# Patient Record
Sex: FEMALE | Race: WHITE | Employment: FULL TIME | URBAN - METROPOLITAN AREA
[De-identification: names, ages, dates, MRNs, and addresses within clinical notes are randomized per-mention and may not be internally consistent; named-entity substitution may affect disease eponyms.]

---

## 2017-08-31 ENCOUNTER — HOSPITAL ENCOUNTER (OUTPATIENT)
Dept: ULTRASOUND IMAGING | Age: 27
Discharge: HOME | End: 2017-08-31
Payer: COMMERCIAL

## 2017-08-31 ENCOUNTER — APPOINTMENT (OUTPATIENT)
Dept: CT IMAGING | Age: 27
End: 2017-08-31
Payer: COMMERCIAL

## 2017-08-31 DIAGNOSIS — Q89.9: ICD-10-CM

## 2017-08-31 PROCEDURE — 76770 US EXAM ABDO BACK WALL COMP: CPT

## 2017-09-27 LAB
HBSAG, EXTERNAL: NEGATIVE
HIV, EXTERNAL: NORMAL
RUBELLA, EXTERNAL: NORMAL
TYPE, ABO & RH, EXTERNAL: NORMAL

## 2018-01-25 LAB
ANTIBODY SCREEN, EXTERNAL: NEGATIVE
T. PALLIDUM, EXTERNAL: NEGATIVE

## 2018-03-22 LAB — GRBS, EXTERNAL: NEGATIVE

## 2018-04-26 ENCOUNTER — HOSPITAL ENCOUNTER (EMERGENCY)
Age: 28
Discharge: HOME | End: 2018-04-27
Attending: OBSTETRICS & GYNECOLOGY | Admitting: OBSTETRICS & GYNECOLOGY
Payer: COMMERCIAL

## 2018-04-26 VITALS
HEIGHT: 63 IN | BODY MASS INDEX: 35.26 KG/M2 | DIASTOLIC BLOOD PRESSURE: 70 MMHG | TEMPERATURE: 98.6 F | WEIGHT: 199 LBS | HEART RATE: 85 BPM | SYSTOLIC BLOOD PRESSURE: 120 MMHG | RESPIRATION RATE: 16 BRPM

## 2018-04-26 RX ORDER — LORATADINE 10 MG/1
10 TABLET ORAL DAILY
COMMUNITY
End: 2018-04-29

## 2018-04-26 NOTE — IP AVS SNAPSHOT
[address] 
[phone] Patient: Albina Valencia MRN: [id number] VXC:0/96/2236 A check katia indicates which time of day the medication should be taken. My Medications ASK your doctor about these medications Instructions Each Dose to Equal  
 Morning Noon Evening Bedtime CLARITIN 10 mg tablet Generic drug:  loratadine Your last dose was: Your next dose is: Take 10 mg by mouth daily. Indications: Allergic Rhinitis 10 mg PNV66-Iron Fumarate-FA-DSS-DHA 26-1.2- mg Cap Your last dose was: Your next dose is: Take  by mouth daily. Indications: pregnancy

## 2018-04-26 NOTE — IP AVS SNAPSHOT
[address] [facility] [address] [place] 
[phone] Patient: Darryle Pummel MRN: [id number] KERRIE:[birth year] About your hospitalization You were admitted on:  N/A You last received care in the:  [facility] 3 LABOR & DELIVERY You were discharged on:  April 27, 2018 Why you were hospitalized Your primary diagnosis was:  Not on File Follow-up Information Follow up With Details Comments Contact Info Faby Yu MD Call If symptoms worsen [facility] [address] [place] 
[phone] Discharge Orders None A check katia indicates which time of day the medication should be taken. My Medications ASK your doctor about these medications Instructions Each Dose to Equal  
 Morning Noon Evening Bedtime CLARITIN 10 mg tablet Generic drug:  loratadine Your last dose was: Your next dose is: Take 10 mg by mouth daily. Indications: Allergic Rhinitis 10 mg PNV66-Iron Fumarate-FA-DSS-DHA 26-1.2- mg Cap Your last dose was: Your next dose is: Take  by mouth daily. Indications: pregnancy Discharge Instructions Week 40 of Your Pregnancy: Care Instructions Your Care Instructions By week 36, you have reached your due date. Your baby could be coming any day. But it's a good idea to think ahead to the next few weeks and what might happen. If this is your first time having a baby, try not to worry. If you don't start labor on your own by 41 or 42 weeks, your doctor may recommend giving you medicines to start labor. This care sheet gives you information about how labor can be started. It also gives you some ideas about breathing exercises you can do if you start to feel anxious or if you are trying to relax. Follow-up care is a key part of your treatment and safety. Be sure to make and go to all appointments, and call your doctor if you are having problems. It's also a good idea to know your test results and keep a list of the medicines you take. How can you care for yourself at home? Learn how labor can be started · If you and your baby are both healthy and ready, and if your cervix has started to open, your doctor may \"break your water\" (rupture the amniotic sac). This often starts labor. · If your cervix is not quite ready, you may get a medicine called Pitocin through an IV to start contractions. · If your cervix is still very firm, you may have prostaglandin tablets (misoprostol) placed in your vagina to soften the cervix. Try guided imagery to help you relax · Find a comfortable place to sit or lie down. Close your eyes. · Start by just taking a few deep breaths to help you relax. · Picture a setting that is calm and peaceful. This could be a beach, a mountain setting, a meadow, or a scene that you choose. · Imagine your scene, and try to add some detail. For example, is there a breeze? What does the kev look like? Is it clear, or are there clouds? · It often helps to add a path to your scene. For example, as you enter the meadow, imagine a path leading you through the meadow to the trees on the other side. As you follow the path farther into the [facility] you feel more and more relaxed. · When you are deep into your scene and are feeling relaxed, take a few minutes to breathe slowly and feel the calm. · When you are ready, slowly take yourself out of the scene back to the present. Tell yourself that you will feel relaxed and refreshed and will bring that sense of calm with you. · Count to 3, and open your eyes. Where can you learn more? Go to http://kaylyn-jesus.info/. Enter X784 in the search box to learn more about \"Week 40 of Your Pregnancy: Care Instructions. \" 
 Current as of: March 16, 2017 Content Version: 11.4 © 8605-1179 Healthwise, [employer]. Care instructions adapted under license by [employer] (which disclaims liability or warranty for this information). If you have questions about a medical condition or this instruction, always ask your healthcare professional. [address] any warranty or liability for your use of this information. Introducing [facility]! Brittney Blandon introduces [employer] patient portal. Now you can access parts of your medical record, email your doctor's office, and request medication refills online. 1. In your internet browser, go to [email or web address] 2. Click on the First Time User? Click Here link in the Sign In box. You will see the New Member Sign Up page. 3. Enter your [employer] Access Code exactly as it appears below. You will not need to use this code after youve completed the sign-up process. If you do not sign up before the expiration date, you must request a new code. · [employer] Access Code: [email or web address] Expires: 7/26/2018 12:36 AM 
 
4. Enter the last four digits of your Social Security Number (xxxx) and Date of Birth (mm/dd/yyyy) as indicated and click Submit. You will be taken to the next sign-up page. 5. Create a [employer] ID. This will be your [employer] login ID and cannot be changed, so think of one that is secure and easy to remember. 6. Create a [employer] password. You can change your password at any time. 7. Enter your Password Reset Question and Answer. This can be used at a later time if you forget your password. 8. Enter your e-mail address. You will receive e-mail notification when new information is available in [address] 9. Click Sign Up. You can now view and download portions of your medical record. 10. Click the Download Summary menu link to download a portable copy of your medical information. If you have questions, please visit the Frequently Asked Questions section of the MyChart website. Remember, [employer] is NOT to be used for urgent needs. For medical emergencies, dial 911. Now available from your iPhone and Android! Introducing Enrique Clifford As a [facility] patient, I wanted to make you aware of our electronic visit tool called Enrique Clifford. Yuyutonakitamb 24/7 allows you to connect within minutes with a medical provider 24 hours a day, seven days a week via a mobile device or tablet or logging into a secure website from your computer. You can access Enrique Clifford from anywhere in the [place]. A virtual visit might be right for you when you have a simple condition and feel like you just dont want to get out of bed, or cant get away from work for an appointment, when your regular [facility] provider is not available (evenings, weekends or holidays), or when youre out of town and need minor care. Electronic visits cost only $49 and if the [facility] 24/7 provider determines a prescription is needed to treat your condition, one can be electronically transmitted to a nearby pharmacy*. Please take a moment to enroll today if you have not already done so. The enrollment process is free and takes just a few minutes. To enroll, please download the [employer] 24/7 mario to your tablet or phone, or visit [email or web address] to enroll on your computer. And, as an [address] [place] [address] patient with a [employer]ractive account, the results of your visits will be scanned into your electronic medical record and your primary care provider will be able to view the scanned results. We urge you to continue to see your regular [facility] provider for your ongoing medical care.   And while your primary care provider may not be the one available when you seek a Enrique Clifford virtual visit, the peace of mind you get from getting a real diagnosis real time can be priceless. For more information on Enrique Ervinfany, view our Frequently Asked Questions (FAQs) at [email or web address]. Sincerely, 
 
Teri Marrero MD 
Chief Medical Officer [address] *:  certain medications cannot be prescribed via Enrique Clifford Providers Seen During Your Hospitalization Provider Specialty Primary office phone Glenda Lira MD Obstetrics & Gynecology [phone] Your Primary Care Physician (PCP) Primary Care Physician Office Phone Office Fax OTHER, PHYS ** None ** ** None ** You are allergic to the following Allergen Reactions Latex Rash Other Food Anaphylaxis Honor Perone Recent Documentation Height Weight Breastfeeding? BMI OB Status Smoking Status 1.6 m 90.3 kg No 35.25 kg/m2 Pregnant Never Smoker Emergency Contacts Name Discharge Info Relation Home Work Mobile Jeremi Meyer DISCHARGE CAREGIVER [3] Spouse [3] [phone] Patient Belongings The following personal items are in your possession at time of discharge: 
  Dental Appliances: None  Visual Aid: Contacts, Glasses, With patient   Hearing Aids/Status: With patient, Bilateral  Home Medications: Kept at bedside (Claritin)   Jewelry: Ring, Earrings  Clothing: At bedside, Pants, Shirt, With patient, Undergarments, Footwear    Other Valuables: Cell Phone, Sierra Ball  Personal Items Sent to Safe: none Please provide this summary of care documentation to your next provider. Signatures-by signing, you are acknowledging that this After Visit Summary has been reviewed with you and you have received a copy. Patient Signature:  ____________________________________________________________ Date:  ____________________________________________________________  
  
Camille Chvaez  Provider Signature: ____________________________________________________________ Date:  ____________________________________________________________

## 2018-04-27 ENCOUNTER — HOSPITAL ENCOUNTER (INPATIENT)
Age: 28
LOS: 2 days | Discharge: HOME | End: 2018-04-29
Attending: OBSTETRICS & GYNECOLOGY | Admitting: OBSTETRICS & GYNECOLOGY
Payer: COMMERCIAL

## 2018-04-27 LAB
ERYTHROCYTE [DISTWIDTH] IN BLOOD BY AUTOMATED COUNT: 13.9 % (ref 11.5–14.5)
HCT VFR BLD AUTO: 42 % (ref 35–47)
HGB BLD-MCNC: 14.1 G/DL (ref 11.5–16)
MCH RBC QN AUTO: 31.1 PG (ref 26–34)
MCHC RBC AUTO-ENTMCNC: 33.6 G/DL (ref 30–36.5)
MCV RBC AUTO: 92.7 FL (ref 80–99)
NRBC # BLD: 0 K/UL (ref 0–0.01)
NRBC BLD-RTO: 0 PER 100 WBC
PLATELET # BLD AUTO: 175 K/UL (ref 150–400)
PMV BLD AUTO: 11.1 FL (ref 8.9–12.9)
RBC # BLD AUTO: 4.53 M/UL (ref 3.8–5.2)
WBC # BLD AUTO: 12.4 K/UL (ref 3.6–11)

## 2018-04-27 PROCEDURE — 74011250636 HC RX REV CODE- 250/636: Performed by: OBSTETRICS & GYNECOLOGY

## 2018-04-27 PROCEDURE — 36415 COLL VENOUS BLD VENIPUNCTURE: CPT | Performed by: OBSTETRICS & GYNECOLOGY

## 2018-04-27 PROCEDURE — 75410000003 HC RECOV DEL/VAG/CSECN EA 0.5 HR

## 2018-04-27 PROCEDURE — 65410000002 HC RM PRIVATE OB

## 2018-04-27 PROCEDURE — 74011250637 HC RX REV CODE- 250/637: Performed by: OBSTETRICS & GYNECOLOGY

## 2018-04-27 PROCEDURE — 99283 EMERGENCY DEPT VISIT LOW MDM: CPT

## 2018-04-27 PROCEDURE — 75410000000 HC DELIVERY VAGINAL/SINGLE

## 2018-04-27 PROCEDURE — 75410000002 HC LABOR FEE PER 1 HR

## 2018-04-27 PROCEDURE — 85027 COMPLETE CBC AUTOMATED: CPT | Performed by: OBSTETRICS & GYNECOLOGY

## 2018-04-27 RX ORDER — HYDROCORTISONE ACETATE PRAMOXINE HCL 2.5; 1 G/100G; G/100G
CREAM TOPICAL AS NEEDED
Status: DISCONTINUED | OUTPATIENT
Start: 2018-04-27 | End: 2018-04-29 | Stop reason: HOSPADM

## 2018-04-27 RX ORDER — SODIUM CHLORIDE 0.9 % (FLUSH) 0.9 %
SYRINGE (ML) INJECTION
Status: DISPENSED
Start: 2018-04-27 | End: 2018-04-28

## 2018-04-27 RX ORDER — IBUPROFEN 400 MG/1
800 TABLET ORAL EVERY 8 HOURS
Status: DISCONTINUED | OUTPATIENT
Start: 2018-04-27 | End: 2018-04-29 | Stop reason: HOSPADM

## 2018-04-27 RX ORDER — SIMETHICONE 80 MG
80 TABLET,CHEWABLE ORAL
Status: DISCONTINUED | OUTPATIENT
Start: 2018-04-27 | End: 2018-04-29 | Stop reason: HOSPADM

## 2018-04-27 RX ORDER — DIPHENHYDRAMINE HCL 25 MG
25 CAPSULE ORAL
Status: DISCONTINUED | OUTPATIENT
Start: 2018-04-27 | End: 2018-04-29 | Stop reason: HOSPADM

## 2018-04-27 RX ORDER — SODIUM CHLORIDE 0.9 % (FLUSH) 0.9 %
5-10 SYRINGE (ML) INJECTION AS NEEDED
Status: CANCELLED | OUTPATIENT
Start: 2018-04-27

## 2018-04-27 RX ORDER — ACETAMINOPHEN 325 MG/1
650 TABLET ORAL
Status: DISCONTINUED | OUTPATIENT
Start: 2018-04-27 | End: 2018-04-27 | Stop reason: HOSPADM

## 2018-04-27 RX ORDER — MINERAL OIL
OIL (ML) ORAL
Status: DISPENSED
Start: 2018-04-27 | End: 2018-04-28

## 2018-04-27 RX ORDER — TERBUTALINE SULFATE 1 MG/ML
0.25 INJECTION SUBCUTANEOUS AS NEEDED
Status: DISCONTINUED | OUTPATIENT
Start: 2018-04-27 | End: 2018-04-27 | Stop reason: HOSPADM

## 2018-04-27 RX ORDER — OXYCODONE AND ACETAMINOPHEN 5; 325 MG/1; MG/1
1 TABLET ORAL
Status: DISCONTINUED | OUTPATIENT
Start: 2018-04-27 | End: 2018-04-29 | Stop reason: HOSPADM

## 2018-04-27 RX ORDER — ONDANSETRON 2 MG/ML
INJECTION INTRAMUSCULAR; INTRAVENOUS
Status: DISPENSED
Start: 2018-04-27 | End: 2018-04-28

## 2018-04-27 RX ORDER — ONDANSETRON 2 MG/ML
4 INJECTION INTRAMUSCULAR; INTRAVENOUS
Status: DISCONTINUED | OUTPATIENT
Start: 2018-04-27 | End: 2018-04-27 | Stop reason: HOSPADM

## 2018-04-27 RX ORDER — SODIUM CHLORIDE, SODIUM LACTATE, POTASSIUM CHLORIDE, CALCIUM CHLORIDE 600; 310; 30; 20 MG/100ML; MG/100ML; MG/100ML; MG/100ML
125 INJECTION, SOLUTION INTRAVENOUS CONTINUOUS
Status: CANCELLED | OUTPATIENT
Start: 2018-04-27

## 2018-04-27 RX ORDER — ACETAMINOPHEN 325 MG/1
650 TABLET ORAL
Status: DISCONTINUED | OUTPATIENT
Start: 2018-04-27 | End: 2018-04-29 | Stop reason: HOSPADM

## 2018-04-27 RX ORDER — OXYTOCIN IN 5 % DEXTROSE 30/500 ML
PLASTIC BAG, INJECTION (ML) INTRAVENOUS
Status: DISPENSED
Start: 2018-04-27 | End: 2018-04-28

## 2018-04-27 RX ORDER — ONDANSETRON 4 MG/1
4 TABLET, ORALLY DISINTEGRATING ORAL
Status: DISCONTINUED | OUTPATIENT
Start: 2018-04-27 | End: 2018-04-29 | Stop reason: HOSPADM

## 2018-04-27 RX ORDER — SODIUM CHLORIDE 0.9 % (FLUSH) 0.9 %
5-10 SYRINGE (ML) INJECTION EVERY 8 HOURS
Status: CANCELLED | OUTPATIENT
Start: 2018-04-27

## 2018-04-27 RX ORDER — LIDOCAINE HYDROCHLORIDE 10 MG/ML
INJECTION INFILTRATION; PERINEURAL
Status: DISPENSED
Start: 2018-04-27 | End: 2018-04-28

## 2018-04-27 RX ORDER — NALOXONE HYDROCHLORIDE 0.4 MG/ML
0.4 INJECTION, SOLUTION INTRAMUSCULAR; INTRAVENOUS; SUBCUTANEOUS AS NEEDED
Status: DISCONTINUED | OUTPATIENT
Start: 2018-04-27 | End: 2018-04-29 | Stop reason: HOSPADM

## 2018-04-27 RX ORDER — NALOXONE HYDROCHLORIDE 0.4 MG/ML
0.4 INJECTION, SOLUTION INTRAMUSCULAR; INTRAVENOUS; SUBCUTANEOUS AS NEEDED
Status: DISCONTINUED | OUTPATIENT
Start: 2018-04-27 | End: 2018-04-27 | Stop reason: HOSPADM

## 2018-04-27 RX ORDER — OXYTOCIN/RINGER'S LACTATE 20/1000 ML
125-500 PLASTIC BAG, INJECTION (ML) INTRAVENOUS ONCE
Status: ACTIVE | OUTPATIENT
Start: 2018-04-27 | End: 2018-04-28

## 2018-04-27 RX ADMIN — ONDANSETRON 4 MG: 2 INJECTION INTRAMUSCULAR; INTRAVENOUS at 12:45

## 2018-04-27 RX ADMIN — IBUPROFEN 800 MG: 400 TABLET ORAL at 22:37

## 2018-04-27 NOTE — DISCHARGE INSTRUCTIONS
Week 40 of Your Pregnancy: Care Instructions  Your Care Instructions    By week 40, you have reached your due date. Your baby could be coming any day. But it's a good idea to think ahead to the next few weeks and what might happen. If this is your first time having a baby, try not to worry. If you don't start labor on your own by 41 or 42 weeks, your doctor may recommend giving you medicines to start labor. This care sheet gives you information about how labor can be started. It also gives you some ideas about breathing exercises you can do if you start to feel anxious or if you are trying to relax. Follow-up care is a key part of your treatment and safety. Be sure to make and go to all appointments, and call your doctor if you are having problems. It's also a good idea to know your test results and keep a list of the medicines you take. How can you care for yourself at home? Learn how labor can be started  · If you and your baby are both healthy and ready, and if your cervix has started to open, your doctor may \"break your water\" (rupture the amniotic sac). This often starts labor. · If your cervix is not quite ready, you may get a medicine called Pitocin through an IV to start contractions. · If your cervix is still very firm, you may have prostaglandin tablets (misoprostol) placed in your vagina to soften the cervix. Try guided imagery to help you relax  · Find a comfortable place to sit or lie down. Close your eyes. · Start by just taking a few deep breaths to help you relax. · Picture a setting that is calm and peaceful. This could be a beach, a mountain setting, a meadow, or a scene that you choose. · Imagine your scene, and try to add some detail. For example, is there a breeze? What does the kev look like? Is it clear, or are there clouds? · It often helps to add a path to your scene.  For example, as you enter the meadow, imagine a path leading you through the meadow to the trees on the other side. As you follow the path farther into the [facility] you feel more and more relaxed. · When you are deep into your scene and are feeling relaxed, take a few minutes to breathe slowly and feel the calm. · When you are ready, slowly take yourself out of the scene back to the present. Tell yourself that you will feel relaxed and refreshed and will bring that sense of calm with you. · Count to 3, and open your eyes. Where can you learn more? Go to http://kaylyn-jesus.info/. Enter I328 in the search box to learn more about \"Week 40 of Your Pregnancy: Care Instructions. \"  Current as of: March 16, 2017  Content Version: 11.4  © 5875-2810 Healthwise, [employer]. Care instructions adapted under license by [employer] (which disclaims liability or warranty for this information). If you have questions about a medical condition or this instruction, always ask your healthcare professional. [address] any warranty or liability for your use of this information.

## 2018-04-27 NOTE — H&P
History & Physical    Name: Rachel Ordonez MRN: [id number]  SSN: [id number]    YOB: 1990  Age: 33 y.o. Sex: female        Subjective:     Estimated Date of Delivery: 18  OB History    Para Term  AB Living   1        SAB TAB Ectopic Molar Multiple Live Births              # Outcome Date GA Lbr Ron/2nd Weight Sex Delivery Anes PTL Lv   1 Current                   Ms. Socorro Sargent is admitted with pregnancy at 40w5d for active labor. Prenatal course was normal. Please see prenatal records for details. Past Medical History:   Diagnosis Date    Asthma      Past Surgical History:   Procedure Laterality Date    HX OTHER SURGICAL      Jaw surgery     HX WISDOM TEETH EXTRACTION       Social History     Occupational History    Not on file. Social History Main Topics    Smoking status: Never Smoker    Smokeless tobacco: Never Used    Alcohol use No    Drug use: No    Sexual activity: Yes     Partners: Male     Birth control/ protection: None     Family History   Problem Relation Age of Onset    Hypertension Mother        Allergies   Allergen Reactions    Latex Rash    Other Food Anaphylaxis     [place]     Prior to Admission medications    Medication Sig Start Date End Date Taking? Authorizing Provider   PNV66-Iron Fumarate-FA-DSS-DHA 26-1.2- mg cap Take  by mouth daily. Indications: pregnancy    Historical Provider   loratadine (CLARITIN) 10 mg tablet Take 10 mg by mouth daily. Indications: Allergic Rhinitis    Historical Provider        Review of Systems: A comprehensive review of systems was negative except for that written in the HPI.     Objective:     Vitals:  Vitals:    18 0954   BP: 129/76   Pulse: 81   Resp: 16   Temp: 98.2 °F (36.8 °C)   Weight: 90.3 kg (199 lb)   Height: 5' 3\" (1.6 m)        Physical Exam:per L and D staff  Cervical Exam: 4 cm dilated    80% effaced    -2 station    Presenting Part: cephalic  Membranes:  Intact  Fetal Heart Rate: Reactive    Prenatal Labs:   Lab Results   Component Value Date/Time    Rubella, External Immune 09/27/2017    GrBStrep, External Negative 03/22/2018    HBsAg, External Negative 09/27/2017    HIV, External Non-Reactive 09/27/2017    ABO,Rh O Positive 09/27/2017         Assessment/Plan:     Active Problems:    * No active hospital problems. *       Plan: Admit for Reassuring fetal status, Continue plan for vaginal delivery. Group B Strep was negative.     Signed By:  Connie Goodpasture, MD     April 27, 2018

## 2018-04-27 NOTE — PROGRESS NOTES
: Bedside, Verbal and Written shift change report given to SINGH Flores (oncoming nurse) by LAZ Hector RN (offgoing nurse). Report included the following information SBAR.     :  of female. : Patient up to bedside commode. Unable to void at this time. : TRANSFER - OUT REPORT:    Verbal report given to ALFREDO Herzog RN(name) on Yonatan Pichardo  being transferred to labor and delivery (unit) for routine progression of care       Report consisted of patients Situation, Background, Assessment and   Recommendations(SBAR). Information from the following report(s) SBAR was reviewed with the receiving nurse. Lines:   Peripheral IV 18 Right Forearm (Active)   Site Assessment Clean, dry, & intact 2018  8:04 PM   Phlebitis Assessment 0 2018  8:04 PM   Infiltration Assessment 0 2018  8:04 PM   Dressing Status Clean, dry, & intact 2018  8:04 PM   Dressing Type Transparent 2018  8:04 PM   Hub Color/Line Status Pink 2018  8:04 PM        Opportunity for questions and clarification was provided.       Patient transported with:   Registered Nurse

## 2018-04-27 NOTE — PROGRESS NOTES
5722: Patient arrived from home with complaints of contractions since 0300 this morning. Patient denies signs of SROM, states spotting after cervical exam yesterday. States positive fetal movement. Placed in LD room 7 and assessing now. 1000: Dr. Yolanda Bishop notified of patient's arrival    [phone]: Dr. Yolanda Bishop in to meet patient. 1700: Bedside and Verbal shift change report given to LAZ Gomez (oncoming nurse) by ADELA Bae (offgoing nurse). Report included the following information SBAR, Procedure Summary, Intake/Output, MAR and Recent Results.

## 2018-04-27 NOTE — IP AVS SNAPSHOT
2700 [facility] [address] [place] [address] 
[phone] Patient: John Erwin MRN: [id number] [facility]:8[birth year]39[birth year]94 About your hospitalization You were admitted on:  April 27, 2018 You last received care in the:  [address] [facility] You were discharged on:  April 29, 2018 Why you were hospitalized Your primary diagnosis was:  Not on File Your diagnoses also included:  Pregnancy Follow-up Information Follow up With Details Comments Contact Info Marvel Alvarez MD   Patient can only remember the practice name and not the physician Odalis Camarillo MD Schedule an appointment as soon as possible for a visit in 7 weeks  [facility] 200 [address] [place] [address] 
[phone] Discharge Orders None A check katia indicates which time of day the medication should be taken. My Medications START taking these medications Instructions Each Dose to Equal  
 Morning Noon Evening Bedtime  
 ibuprofen 600 mg tablet Commonly known as:  MOTRIN Your last dose was: Your next dose is: Take 1 Tab by mouth every six (6) hours as needed for Pain. 600 mg CONTINUE taking these medications Instructions Each Dose to Equal  
 Morning Noon Evening Bedtime PNV66-Iron Fumarate-FA-DSS-DHA 26-1.2- mg Cap Your last dose was: Your next dose is: Take  by mouth daily. Indications: pregnancy STOP taking these medications CLARITIN 10 mg tablet Generic drug:  loratadine Where to Get Your Medications Information on where to get these meds will be given to you by the nurse or doctor. ! Ask your nurse or doctor about these medications  
  ibuprofen 600 mg tablet Discharge Instructions POSTPARTUM DISCHARGE INSTRUCTIONS Name:  John Erwin YOB: 1990 Admission Diagnosis:  . Pregnancy Discharge Diagnosis:   
Problem List as of 4/28/2018  Never Reviewed Codes Class Noted - Resolved Pregnancy ICD-10-CM: Z34.90 ICD-9-CM: V22.2  4/28/2018 - Present Attending Physician:  Taiwo Maurice MD 
 
Delivery Type:  Vaginal Childbirth: What To Expect At Home Your Recovery: Your body will slowly heal in the next few weeks. It is easy to get too tired and overwhelmed during the first weeks after your baby is born. Changes in your hormones can shift your mood without warning. You may find it hard to meet the extra demands on your energy and time. Take it easy on yourself. Follow-up care is a key part of your treatment and safety. Be sure to make and go to all appointments, and call your doctor if you are having problems. It's also a good idea to know your test results and keep a list of the medicines you take. How can you care for yourself at home? Vaginal bleeding and cramps · After delivery, you will have a bloody discharge from the vagina. This will turn pink within a week and then white or yellow after about 10 days. It may last for 2 to 4 weeks or longer, until the uterus has healed. Use pads instead of tampons until you stop bleeding. · Do not worry if you pass some blood clots, as long as they are smaller than a golf ball. If you have a tear or stitches in your vaginal area, change the pad at least every 4 hours to prevent soreness and infection. · You may have cramps for the first few days after childbirth. These are normal and occur as the uterus shrinks to normal size. Take an over-the-counter pain medicine, such as acetaminophen (Tylenol), ibuprofen (Advil, Motrin), or naproxen (Aleve), for cramps. Read and follow all instructions on the label. Do not take aspirin, because it can cause more bleeding.   Do not take acetaminophen (Tylenol) and other acetaminophen containing medications (i.e. Percocet) at the same time. Breast fullness · Your breasts may overfill (engorge) in the first few days after delivery. To help milk flow and to relieve pain, warm your breasts in the shower or by using warm, moist towels before nursing. · If you are not nursing, do not put warmth on your breasts or touch your breasts. Wear a tight bra or sports bra and use ice until the fullness goes away. This usually takes 2 to 3 days. · Put ice or a cold pack on your breast after nursing to reduce swelling and pain. Put a thin cloth between the ice and your skin. Activity · Eat a balanced diet. Do not try to lose weight by cutting calories. Keep taking your prenatal vitamins, or take a multivitamin. · Get as much rest as you can. Try to take naps when your baby sleeps during the day. · Get some exercise every day. But do not do any heavy exercise until your doctor says it is okay. · Wait until you are healed (about 4 to 6 weeks) before you have sexual intercourse. Your doctor will tell you when it is okay to have sex. · Talk to your doctor about birth control. You can get pregnant even before your period returns. Also, you can get pregnant while you are breast-feeding. Mental Health · Many women get the \"baby blues\" during the first few days after childbirth. You may lose sleep, feel irritable, and cry easily. You may feel happy one minute and sad the next. Hormone changes are one cause of these emotional changes. Also, the demands of a new baby, along with visits from relatives or other family needs, add to a mother's stress. The \"baby blues\" often peak around the fourth day. Then they ease up in less than 2 weeks. · If your moodiness or anxiety lasts for more than 2 weeks, or if you feel like life is not worth living, you may have postpartum depression. This is different for each mother.  Some mothers with serious depression may worry intensely about their infant's well-being. Others may feel distant from their child. Some mothers might even feel that they might harm their baby. A mother may have signs of paranoia, wondering if someone is watching her. · With all the changes in your life, you may not know if you are depressed. Pregnancy sometimes causes changes in how you feel that are similar to the symptoms of depression. · Symptoms of depression include: · Feeling sad or hopeless and losing interest in daily activities. These are the most common symptoms of depression. · Sleeping too much or not enough. · Feeling tired. You may feel as if you have no energy. · Eating too much or too little. · POSTPARTUM SUPPORT INTERNATIONAL (PSI) offers a Warm line; Chat with the Expert phone sessions; Information and Articles about Pregnancy and Postpartum Mood Disorders; Comprehensive List of Free Support Groups; Knowledgeable local coordinators who will offer support, information, and resources; Guide to Resources on [employer]; Calendar of events in the  mood disorders community; Latest News and Research; and [facility] [address] for [employer]. Remember - You are not alone; You are not to blame; With help, you will be well. 3-362-624-PPD(2020). WWW. POSTPARTUM. NET · Writing or talking about death, such as writing suicide notes or talking about guns, knives, or pills. Keep the numbers for these national suicide hotlines: 3-709-870-TALK (5-912.391.2688) and 5-005-FRIVUYK (8-981.140.2744). If you or someone you know talks about suicide or feeling hopeless, get help right away. Constipation and Hemorrhoids · Drink plenty of fluids, enough so that your urine is light yellow or clear like water. If you have kidney, heart, or liver disease and have to limit fluids, talk with your doctor before you increase the amount of fluids you drink. · Eat plenty of fiber each day.  Have a bran muffin or bran cereal for breakfast, and try eating a piece of fruit for a mid-afternoon snack. · For painful, itchy hemorrhoids, put ice or a cold pack on the area several times a day for 10 minutes at a time. Follow this by putting a warm compress on the area for another 10 to 20 minutes or by sitting in a shallow, warm bath. When should you call for help? Call 911 anytime you think you may need emergency care. For example, call if: 
· You are thinking of hurting yourself, your baby, or anyone else. · You passed out (lost consciousness). · You have symptoms of a blood clot in your lung (called a pulmonary embolism). These may include:   
· Sudden chest pain. · Trouble breathing. · Coughing up blood. Call your doctor now or seek immediate medical care if: 
· You have severe vaginal bleeding. · You are soaking through a pad each hour for 2 or more hours. · Your vaginal bleeding seems to be getting heavier or is still bright red 4 days after delivery. · You are dizzy or lightheaded, or you feel like you may faint. · You are vomiting or cannot keep fluids down. · You have a fever. · You have new or more belly pain. · You pass tissue (not just blood). · Your vaginal discharge smells bad. · Your belly feels tender or full and hard. · Your breasts are continuously painful or red. · You feel sad, anxious, or hopeless for more than a few days. · You have sudden, severe pain in your belly. · You have symptoms of a blood clot in your leg (called a deep vein thrombosis),  
       such as: 
· Pain in your calf, back of the knee, thigh, or groin. · Redness and swelling in your leg or groin. · You have symptoms of preeclampsia, such as: 
· Sudden swelling of your face, hands, or feet. · New vision problems (such as dimness or blurring). · A severe headache. · Your blood pressure is higher than it should be or rises suddenly. · You have new nausea or vomiting. Watch closely for changes in your health, and be sure to contact your doctor if you have any problems. Additional Information:  Not applicable These are general instructions for a healthy lifestyle: No smoking/ No tobacco products/ Avoid exposure to second hand smoke Surgeon General's Warning:  Quitting smoking now greatly reduces serious risk to your health. Obesity, smoking, and sedentary lifestyle greatly increases your risk for illness A healthy diet, regular physical exercise & weight monitoring are important for maintaining a healthy lifestyle Recognize signs and symptoms of STROKE: 
 
F-face looks uneven A-arms unable to move or move unevenly S-speech slurred or non-existent T-time-call 911 as soon as signs and symptoms begin - DO NOT go  
    back to bed or wait to see if you get better - TIME IS BRAIN. I have had the opportunity to make my options or choices for discharge. I have received and understand these instructions. Introducing [facility]! [employer] introduces Unifysquare patient portal. Now you can access parts of your medical record, email your doctor's office, and request medication refills online. 1. In your internet browser, go to [email or web address] 2. Click on the First Time User? Click Here link in the Sign In box. You will see the New Member Sign Up page. 3. Enter your Unifysquare Access Code exactly as it appears below. You will not need to use this code after youve completed the sign-up process. If you do not sign up before the expiration date, you must request a new code. · Unifysquare Access Code: [email or web address] Expires: 7/26/2018 12:36 AM 
 
4. Enter the last four digits of your Social Security Number (xxxx) and Date of Birth (mm/dd/yyyy) as indicated and click Submit. You will be taken to the next sign-up page. 5. Create a Unifysquare ID.  This will be your Unifysquare login ID and cannot be changed, so think of one that is secure and easy to remember. 6. Create a Champion Windows password. You can change your password at any time. 7. Enter your Password Reset Question and Answer. This can be used at a later time if you forget your password. 8. Enter your e-mail address. You will receive e-mail notification when new information is available in [address] 9. Click Sign Up. You can now view and download portions of your medical record. 10. Click the Download Summary menu link to download a portable copy of your medical information. If you have questions, please visit the Frequently Asked Questions section of the [employer] Windows website. Remember, Champion Windows is NOT to be used for urgent needs. For medical emergencies, dial 911. Now available from your iPhone and Android! Introducing Enrique Clifford As a Kristgeoff Gutierrezek patient, I wanted to make you aware of our electronic visit tool called Enrique Darrin. Enoc Garcia 24/7 allows you to connect within minutes with a medical provider 24 hours a day, seven days a week via a mobile device or tablet or logging into a secure website from your computer. You can access Enrique Clifford from anywhere in the [place]. A virtual visit might be right for you when you have a simple condition and feel like you just dont want to get out of bed, or cant get away from work for an appointment, when your regular Kriste Peek provider is not available (evenings, weekends or holidays), or when youre out of town and need minor care. Electronic visits cost only $49 and if the Enoc Gutierrezek 24/7 provider determines a prescription is needed to treat your condition, one can be electronically transmitted to a nearby pharmacy*. Please take a moment to enroll today if you have not already done so. The enrollment process is free and takes just a few minutes.   To enroll, please download the [employer] 24/7 mario to your tablet or phone, or visit [email or web address] to enroll on your computer. And, as an [address] [place] [address] patient with a [employer] account, the results of your visits will be scanned into your electronic medical record and your primary care provider will be able to view the scanned results. We urge you to continue to see your regular [facility] provider for your ongoing medical care. And while your primary care provider may not be the one available when you seek a CollabNet virtual visit, the peace of mind you get from getting a real diagnosis real time can be priceless. For more information on CollabNet, view our Frequently Asked Questions (FAQs) at [email or web address]. Sincerely, 
 
Dianah Sicard, MD 
Chief Medical Officer [facility] Kayleigh Guerrero *:  certain medications cannot be prescribed via CollabNet Providers Seen During Your Hospitalization Provider Specialty Primary office phone Salina Alaniz MD Obstetrics & Gynecology [phone] Immunizations Administered for This Admission Name Date MMR  Deferred () Rho(D) Immune Globulin - IM  Deferred () Your Primary Care Physician (PCP) Primary Care Physician Office Phone Office Fax OTHER, PHYS ** None ** ** None ** You are allergic to the following Allergen Reactions Latex Rash Other Food Anaphylaxis Brennan Tonny Recent Documentation Height Weight Breastfeeding? BMI OB Status Smoking Status 1.6 m 90.3 kg Unknown 35.25 kg/m2 Recent pregnancy Never Smoker Emergency Contacts Name Discharge Info Relation Home Work Mobile BetsyJeremi DISCHARGE CAREGIVER [3] Spouse [3] [phone] Patient Belongings  The following personal items are in your possession at time of discharge: 
  Dental Appliances: None  Visual Aid: Glasses   Hearing Aids/Status: With patient, Bilateral  Home Medications: None   Jewelry: With patient Clothing: At bedside    Other Valuables: At bedside, With patient  Personal Items Sent to Safe: none Please provide this summary of care documentation to your next provider. Signatures-by signing, you are acknowledging that this After Visit Summary has been reviewed with you and you have received a copy. Patient Signature:  ____________________________________________________________ Date:  ____________________________________________________________  
  
Jamey Matsu Provider Signature:  ____________________________________________________________ Date:  ____________________________________________________________

## 2018-04-27 NOTE — H&P
Labor and Delivery Admission Note  2018    33 y.o., , female, G1 P 0 at 40.4 weeks an an  Estimated Date of Delivery18:by dates and US presents with c/o vaginal bleeding at 2300  Reports good fetal movement and has mild contractions. She was examined today 2cm, and noted some mucously bleeding now dark red tonight, not soaking a pad. +FM, denies LOF. She has no c/o HA, visual changes, ST, cough, SOB, abd pain, n/d or swelling. PNC: Blood type: O            RH: pos            Rubella: immune            SVII serology: Non reactive             GBS status: Neg             HIV: Neg             HBsAg: Neg    No past medical history on file. - Asthma, hearing loss  No past surgical history on file. OB/GYN: G1, no h/o STD's  Meds:   No current facility-administered medications for this encounter. Allergies: Allergies not on file  Pertinent ROS: All pertinent reviewed and negative unless otherwise noted in the HPI. No family history on file. Social History     Social History    Marital status:      Spouse name: N/A    Number of children: N/A    Years of education: N/A     Occupational History    Not on file.      Social History Main Topics    Smoking status: Not on file    Smokeless tobacco: Not on file    Alcohol use Not on file    Drug use: Not on file    Sexual activity: Not on file     Other Topics Concern    Not on file     Social History Narrative    No narrative on file       OBJECTIVE:  Gravid , female NAD  Temp (24hrs), Av.6 °F (37 °C), Min:98.6 °F (37 °C), Max:98.6 °F (37 °C)    Visit Vitals    /70 (BP 1 Location: Left arm, BP Patient Position: At rest)    Pulse 85    Temp 98.6 °F (37 °C)    Resp 16    Breastfeeding No       Labs:  No results found for: WBC    Exam:  HEENT:  normal   Lungs:  clear  Cor:  RRR  Abdomen:  Fundal height 39                    Soft between UC                    Clinical EFW 7.5 lbs  Fetal heart rate tracin's + accels  Contraction pattern: q 4-5, mild  Cervix:  2/40 per nursing  Fluid:  intact  Pelvimetry:  AP-good                      Arch- adequate                      Sidewalls- adequate                      Pelvis feels adequate for fetus. Impression:    32 yo G1 at 40.4 weeks  Vag bleeding   Suspect d/t cervical exam today, resolving  R/o labor  GBS neg  Reass FS    Plan:   Plan to walk, drea 2 hours to r/o or in labor  NST  Plan reviewed and questions answered.           Royer Moran MD

## 2018-04-27 NOTE — PROGRESS NOTES
2300: , 40.5, pt of Dr. Claudia Flowers admitted to LDR 9 for intermittent contractions and scant vaginal bleeding. Pt states she saw blood on her toilet paper after using the restroom this evening, pt was seen in the office today and a cervical exam was performed. Pt reports no bleeding after this occurrence. Confirms + FM, and denies LOF. 2330: SVE performed, unchanged from office visit today. 2358: Dr. Diane Lang in room to assess pt and review EFM. Pt not complaining of discomfort with contractions at this time, pt presented with opportunity to ambulate in hallway and be reevaluated. 0030: Pt wishes to go home and sleep instead of ambulating in hallway. Dr. Diane Lang back in room to evaluate pt. Discharge instructions received at this time.

## 2018-04-28 PROBLEM — Z34.90 PREGNANCY: Status: ACTIVE | Noted: 2018-04-28

## 2018-04-28 PROCEDURE — 65410000002 HC RM PRIVATE OB

## 2018-04-28 PROCEDURE — 74011250637 HC RX REV CODE- 250/637: Performed by: OBSTETRICS & GYNECOLOGY

## 2018-04-28 RX ORDER — IBUPROFEN 600 MG/1
600 TABLET ORAL
Qty: 30 TAB | Refills: 6 | Status: SHIPPED | OUTPATIENT
Start: 2018-04-28 | End: 2020-04-05

## 2018-04-28 RX ADMIN — IBUPROFEN 800 MG: 400 TABLET ORAL at 18:04

## 2018-04-28 RX ADMIN — IBUPROFEN 800 MG: 400 TABLET ORAL at 07:28

## 2018-04-28 NOTE — LACTATION NOTE
This note was copied from a baby's chart. Asked by Dr. Mayra Toussaint to deep suction the baby because baby was gagging and spitting up in moms room. Baby taken to nursery and deep suctioned for a large amount of thick mucus with old blood. Returned to mom. 830 - Initial Lactation Consultation -  Baby born vaginally last night to a  mom at 36 5/7 weeks gestation. Mom states she has not been able to get the baby to latch. She has been showing feeding cues but not latching. Baby would not suck on my finger. We put baby skin to skin and I showed mom hand expression. With assistance mom was able to express a total of 15 drops of colostrum from both breasts and we put the colostrum into the baby's mouth. Moms nipples are everted but short. She has a latch assist at the bedside. Feeding Plan: Mother will keep baby skin to skin as often as possible, feed on demand,  respond to feeding cues, obtain latch, listen for audible swallowing, be aware of signs of oxytocin release/ cramping,thrist,sleepyness while breastfeeding, offer both breasts,and will not limit feedings. Mom will call out for assistance when baby begins to show more feeding cues.

## 2018-04-28 NOTE — PROGRESS NOTES
Post-Partum Day Number 1 Progress Note    Zulema Meyer     Assessment: Doing well, post partum day 1    Plan:  1. Continue routine postpartum and perineal care as well as maternal education. 2. N/A     Information for the patient's :  Ross Harris [[id number]]   Vaginal, Spontaneous Delivery   Patient doing well without significant complaint. Voiding without difficulty, normal lochia. Vitals:  Visit Vitals    /82 (BP 1 Location: Left arm, BP Patient Position: At rest)    Pulse 91    Temp 97.8 °F (36.6 °C)    Resp 16    Ht 5' 3\" (1.6 m)    Wt 90.3 kg (199 lb)    Breastfeeding Unknown    BMI 35.25 kg/m2     Temp (24hrs), Av.4 °F (36.9 °C), Min:97.8 °F (36.6 °C), Max:99.3 °F (37.4 °C)        Exam:   Patient without distress. Abdomen soft, fundus firm, nontender                Perineum with normal lochia noted. Lower extremities are negative for swelling, cords or tenderness. Labs:     Lab Results   Component Value Date/Time    WBC 12.4 (H) 2018 11:32 AM    HGB 14.1 2018 11:32 AM    HCT 42.0 2018 11:32 AM    PLATELET 262  11:32 AM       No results found for this or any previous visit (from the past 24 hour(s)).

## 2018-04-28 NOTE — L&D DELIVERY NOTE
Delivery Summary    Patient: Tracy Richardson MRN: [id number]  SSN: [id number]    YOB: 1990  Age: 33 y.o. Sex: female        Labor Events:    Labor: No    Rupture Date: 2018    Rupture Time: 7:34 PM    Rupture Type SROM    Amniotic Fluid Volume:      Amniotic Fluid Description: Clear  None    Induction: None        Augmentation: None    Labor Complications: Additional Complications:        Cervical Ripening:       None      Delivery Events:  Episiotomy:      Laceration(s): Vaginal      Repaired: None     Number of Repair Packets: 0    Suture Type and Size:         Estimated Blood Loss (ml): 0        Information for the patient's :  Cele Almazan [[id number]]     Delivery Summary - Baby    Delivery Date: 2018   Delivery Time: 8:42 PM   Delivery Type: Vaginal, Spontaneous Delivery  Sex:  female  Gestational Age: 39w6d  Delivery Clinician:  Dayday Davalos  Living?:     Delivery Location: L&D             APGARS  One minute Five minutes Ten minutes   Skin Color:            Heart Rate:             Reflex Irritability:             Muscle Tone:           Respiration:             Total:               Presentation: Vertex  Position:   Occiput Anterior  Resuscitation Method:  Suctioning-bulb; Tactile Stimulation     Meconium Stained:      Cord Information:     Complications:    Cord Blood Sent?:  Yes    Blood Gases Sent?:  No    Placenta:  Date/Time:    Removal: Spontaneous      Appearance:       [place] Measurements:  Birth Weight:      Birth Length:     Head Circumference:       Chest Circumference:      Abdominal Girth:       Other Providers:   BOB VALDEZ Obstetrician;Primary Nurse;Primary [place] Nurse;Nicu Nurse;Neonatologist;Anesthesiologist;Crna;Nurse Practitioner;Midwife;Nursery Nurse           Cord Blood Results:  Information for the patient's :  Cele Almazan [[id number]]   No results found for: ABORH, PCTABR, PCTDIG, BILI, ABORHEXT, [address]    Information for the patient's :  Mehrdad Beat [[id number]]   No results found for: APH, APCO2, APO2, AHCO3, ABEC, ABDC, O2ST, SITE, RSCOM, PHI, PCO2I, PO2I, HCO3I, SO2I, IBD     Information for the patient's :  [place] Beat [[id number]]   No results found for: EPHV, PCO2V, PO2V, HCO3V, O2STV, EBDV

## 2018-04-29 VITALS
RESPIRATION RATE: 16 BRPM | HEIGHT: 63 IN | HEART RATE: 80 BPM | SYSTOLIC BLOOD PRESSURE: 114 MMHG | WEIGHT: 199 LBS | DIASTOLIC BLOOD PRESSURE: 73 MMHG | TEMPERATURE: 97.5 F | BODY MASS INDEX: 35.26 KG/M2

## 2018-04-29 PROCEDURE — 74011250637 HC RX REV CODE- 250/637: Performed by: OBSTETRICS & GYNECOLOGY

## 2018-04-29 RX ADMIN — IBUPROFEN 800 MG: 400 TABLET ORAL at 10:02

## 2018-04-29 RX ADMIN — IBUPROFEN 800 MG: 400 TABLET ORAL at 02:24

## 2018-04-29 NOTE — DISCHARGE SUMMARY
Obstetrical Discharge Summary     Name: Viktoria Blanchard MRN: [id number]  SSN: [id number]    YOB: 1990  Age: 33 y.o. Sex: female      Admit Date: 2018    Discharge Date: 2018     Admitting Physician: Leighann Jeffries MD     Attending Physician:  No att. providers found     Admission Diagnoses: . Pregnancy    Discharge Diagnoses:   Information for the patient's :  Diana Ray [[id number]]   Delivery of a 3.405 kg female infant via Vaginal, Spontaneous Delivery on 2018 at 8:42 PM  by . Apgars were 7 and 8. Additional Diagnoses:   Hospital Problems  Never Reviewed          Codes Class Noted POA    Pregnancy ICD-10-CM: Z34.90  ICD-9-CM: V22.2  2018 Unknown             Lab Results   Component Value Date/Time    Rubella, External Immune 2017    GrBStrep, External Negative 2018       Hospital Course: Normal hospital course following the delivery. Disposition at Discharge: Home or self care    Discharged Condition: Stable    Patient Instructions:   Cannot display discharge medications since this patient is not currently admitted. Reference my discharge instructions.     Follow-up Appointments   Procedures    FOLLOW UP VISIT Appointment in: 6 Weeks     Standing Status:   Standing     Number of Occurrences:   1     Order Specific Question:   Appointment in     Answer:   6 Weeks        Signed By:  Leighann Jeffries MD     2018

## 2018-04-29 NOTE — DISCHARGE SUMMARY
Obstetrical Discharge Summary     Name: Nevaeh Alberts MRN: [id number]  SSN: [id number]    YOB: 1990  Age: 33 y.o. Sex: female      Admit Date: 2018    Discharge Date: 2018     Admitting Physician: Calvert Nyhan, MD     Attending Physician:  Rose Coyne MD     Admission Diagnoses: . Pregnancy    Discharge Diagnoses:   Information for the patient's :  Mehrdad Beat [[id number]]   Delivery of a 3.405 kg female infant via Vaginal, Spontaneous Delivery on 2018 at 8:42 PM  by . Apgars were 7 and 8. Additional Diagnoses:   Hospital Problems  Never Reviewed          Codes Class Noted POA    Pregnancy ICD-10-CM: Z34.90  ICD-9-CM: V22.2  2018 Unknown             Lab Results   Component Value Date/Time    Rubella, External Immune 2017    GrBStrep, External Negative 2018       Hospital Course: Normal hospital course following the delivery. Disposition at Discharge: Home or self care    Discharged Condition: Stable    Patient Instructions:   Current Discharge Medication List      START taking these medications    Details   ibuprofen (MOTRIN) 600 mg tablet Take 1 Tab by mouth every six (6) hours as needed for Pain. Qty: 30 Tab, Refills: 6         CONTINUE these medications which have NOT CHANGED    Details   PNV66-Iron Fumarate-FA-DSS-DHA 26-1.2- mg cap Take  by mouth daily. Indications: pregnancy         STOP taking these medications       loratadine (CLARITIN) 10 mg tablet Comments:   Reason for Stopping:               Reference my discharge instructions.     Follow-up Appointments   Procedures    FOLLOW UP VISIT Appointment in: 6 Weeks     Standing Status:   Standing     Number of Occurrences:   1     Order Specific Question:   Appointment in     Answer:   6 Weeks        Signed By:  Calvert Nyhan, MD     2018

## 2018-04-29 NOTE — LACTATION NOTE
This note was copied from a baby's chart. Mom states the baby has been latching with the shield. She said she has a good suck on the shield but mom said she is getting very tired after a few minutes of sucking. We were putting a small amount of sugar water in her mouth while she is latched onto the shield to help her keep the baby interested in sucking. Mom said there is a small amount of colostrum in the nipple shield after baby nurses. Mom is able to get the baby to latch to both breasts with the shield. Mom pumped a couple times last evening after nursing and gave the baby drops of colostrum. Dr Laci Ann wants the mom to continue to work on feedings today. She wants a repeat baby weight later this afternoon and she wants baby to pc with formula or breastmilk. I set mom up with the double electric breast pump and encouraged her to pump for 15 minutes after nursing. She will give the baby any colostrum she expresses. She will call out for assistance as needed. Mom will breast feed every 3 hours. 5 -  Mom states the baby nursed for about 10 minutes on the right breast and another 5 on the left breast.  She said the baby has a strong suck on the shield but tires quickly. Mom pumped and we were able to collect 2 cc of breast milk. I showed dad how to syringe feed the breast milk to the baby. Next feeding will be at 6 am.    36 - Mom says she cannot get the baby to latch. She is very sleepy. She tried for about 15 minutes without getting a latch. Mom will pump and offer any breast milk she collects. 36 - Mom states the baby nursed with shield at 1. The parents say the baby is getting better at latching and staying latched on the shield. Mom is pumping after nursing and getting about 2 cc's of colostrum. Baby is being discharged with mom this afternoon. The pediatrician wants mom to continue to put the baby to breast but to then offer 15 cc's of colostrum or formula afterwards.  I reviewed with parents the feeding schedule and they expressed confidence in their ability to feed the baby. They will take the baby to the pediatrician in the morning for a check up. Parents have no further questions for lactation.

## 2018-04-29 NOTE — DISCHARGE INSTRUCTIONS
POSTPARTUM DISCHARGE INSTRUCTIONS       Name:  Deborah Naqvi  YOB: 1990  Admission Diagnosis:  . Pregnancy     Discharge Diagnosis:    Problem List as of 4/28/2018  Never Reviewed          Codes Class Noted - Resolved    Pregnancy ICD-10-CM: Z34.90  ICD-9-CM: V22.2  4/28/2018 - Present            Attending Physician:  Shawna Colón MD    Delivery Type:  Vaginal Childbirth: What To Expect At Home    Your Recovery: Your body will slowly heal in the next few weeks. It is easy to get too tired and overwhelmed during the first weeks after your baby is born. Changes in your hormones can shift your mood without warning. You may find it hard to meet the extra demands on your energy and time. Take it easy on yourself. Follow-up care is a key part of your treatment and safety. Be sure to make and go to all appointments, and call your doctor if you are having problems. It's also a good idea to know your test results and keep a list of the medicines you take. How can you care for yourself at home? Vaginal bleeding and cramps  · After delivery, you will have a bloody discharge from the vagina. This will turn pink within a week and then white or yellow after about 10 days. It may last for 2 to 4 weeks or longer, until the uterus has healed. Use pads instead of tampons until you stop bleeding. · Do not worry if you pass some blood clots, as long as they are smaller than a golf ball. If you have a tear or stitches in your vaginal area, change the pad at least every 4 hours to prevent soreness and infection. · You may have cramps for the first few days after childbirth. These are normal and occur as the uterus shrinks to normal size. Take an over-the-counter pain medicine, such as acetaminophen (Tylenol), ibuprofen (Advil, Motrin), or naproxen (Aleve), for cramps. Read and follow all instructions on the label. Do not take aspirin, because it can cause more bleeding.   Do not take acetaminophen (Tylenol) and other acetaminophen containing medications (i.e. Percocet) at the same time. Breast fullness  · Your breasts may overfill (engorge) in the first few days after delivery. To help milk flow and to relieve pain, warm your breasts in the shower or by using warm, moist towels before nursing. · If you are not nursing, do not put warmth on your breasts or touch your breasts. Wear a tight bra or sports bra and use ice until the fullness goes away. This usually takes 2 to 3 days. · Put ice or a cold pack on your breast after nursing to reduce swelling and pain. Put a thin cloth between the ice and your skin. Activity  · Eat a balanced diet. Do not try to lose weight by cutting calories. Keep taking your prenatal vitamins, or take a multivitamin. · Get as much rest as you can. Try to take naps when your baby sleeps during the day. · Get some exercise every day. But do not do any heavy exercise until your doctor says it is okay. · Wait until you are healed (about 4 to 6 weeks) before you have sexual intercourse. Your doctor will tell you when it is okay to have sex. · Talk to your doctor about birth control. You can get pregnant even before your period returns. Also, you can get pregnant while you are breast-feeding. Mental Health  · Many women get the \"baby blues\" during the first few days after childbirth. You may lose sleep, feel irritable, and cry easily. You may feel happy one minute and sad the next. Hormone changes are one cause of these emotional changes. Also, the demands of a new baby, along with visits from relatives or other family needs, add to a mother's stress. The \"baby blues\" often peak around the fourth day. Then they ease up in less than 2 weeks. · If your moodiness or anxiety lasts for more than 2 weeks, or if you feel like life is not worth living, you may have postpartum depression. This is different for each mother.  Some mothers with serious depression may worry intensely about their infant's well-being. Others may feel distant from their child. Some mothers might even feel that they might harm their baby. A mother may have signs of paranoia, wondering if someone is watching her. · With all the changes in your life, you may not know if you are depressed. Pregnancy sometimes causes changes in how you feel that are similar to the symptoms of depression. · Symptoms of depression include:  · Feeling sad or hopeless and losing interest in daily activities. These are the most common symptoms of depression. · Sleeping too much or not enough. · Feeling tired. You may feel as if you have no energy. · Eating too much or too little. · [employer] ([employer]I) offers a Warm line; Chat with the Expert phone sessions; Information and Articles about Pregnancy and Postpartum Mood Disorders; Comprehensive List of Free Support Groups; Knowledgeable local coordinators who will offer support, information, and resources; Guide to Resources on [employer]; Calendar of events in the  mood disorders community; Latest News and Research; and [facility] [address] for [employer]. Remember - You are not alone; You are not to blame; With help, you will be well. 0-916-771-PPD(0773). WWW. POSTPARTUM. NET   · Writing or talking about death, such as writing suicide notes or talking about guns, knives, or pills. Keep the numbers for these national suicide hotlines: 7-719-756-TALK (3-997.886.4011) and 3-994-KYOEAMH (7-868.651.6617). If you or someone you know talks about suicide or feeling hopeless, get help right away. Constipation and Hemorrhoids  · Drink plenty of fluids, enough so that your urine is light yellow or clear like water. If you have kidney, heart, or liver disease and have to limit fluids, talk with your doctor before you increase the amount of fluids you drink. · Eat plenty of fiber each day.  Have a bran muffin or bran cereal for breakfast, and try eating a piece of fruit for a mid-afternoon snack. · For painful, itchy hemorrhoids, put ice or a cold pack on the area several times a day for 10 minutes at a time. Follow this by putting a warm compress on the area for another 10 to 20 minutes or by sitting in a shallow, warm bath. When should you call for help? Call 911 anytime you think you may need emergency care. For example, call if:  · You are thinking of hurting yourself, your baby, or anyone else. · You passed out (lost consciousness). · You have symptoms of a blood clot in your lung (called a pulmonary embolism). These may include:    · Sudden chest pain. · Trouble breathing. · Coughing up blood. Call your doctor now or seek immediate medical care if:  · You have severe vaginal bleeding. · You are soaking through a pad each hour for 2 or more hours. · Your vaginal bleeding seems to be getting heavier or is still bright red 4 days after delivery. · You are dizzy or lightheaded, or you feel like you may faint. · You are vomiting or cannot keep fluids down. · You have a fever. · You have new or more belly pain. · You pass tissue (not just blood). · Your vaginal discharge smells bad. · Your belly feels tender or full and hard. · Your breasts are continuously painful or red. · You feel sad, anxious, or hopeless for more than a few days. · You have sudden, severe pain in your belly. · You have symptoms of a blood clot in your leg (called a deep vein thrombosis),          such as:  · Pain in your calf, back of the knee, thigh, or groin. · Redness and swelling in your leg or groin. · You have symptoms of preeclampsia, such as:  · Sudden swelling of your face, hands, or feet. · New vision problems (such as dimness or blurring). · A severe headache. · Your blood pressure is higher than it should be or rises suddenly. · You have new nausea or vomiting.     Watch closely for changes in your health, and be sure to contact your doctor if you have any problems. Additional Information:  Not applicable    These are general instructions for a healthy lifestyle:    No smoking/ No tobacco products/ Avoid exposure to second hand smoke    Surgeon General's Warning:  Quitting smoking now greatly reduces serious risk to your health. Obesity, smoking, and sedentary lifestyle greatly increases your risk for illness    A healthy diet, regular physical exercise & weight monitoring are important for maintaining a healthy lifestyle    Recognize signs and symptoms of STROKE:    F-face looks uneven    A-arms unable to move or move unevenly    S-speech slurred or non-existent    T-time-call 911 as soon as signs and symptoms begin - DO NOT go       back to bed or wait to see if you get better - TIME IS BRAIN. I have had the opportunity to make my options or choices for discharge. I have received and understand these instructions.

## 2018-04-29 NOTE — ROUTINE PROCESS
OB SBAR received from [address] [place] [address]     0641: discharge instructions reviewed. Questions answered. To follow up in 6 weeks. Prescription for Motrin given.

## 2018-11-17 NOTE — PROGRESS NOTES
1700-bedside report from DEEDEE Hudson, pt out of shower  1718-sidelying with peanut ball  1635-pt leaning over head of bed on knees, feeling urge to push at times and rectal pressure, coping well Yes

## 2019-09-10 LAB
CHLAMYDIA, EXTERNAL: NEGATIVE
HBSAG, EXTERNAL: NEGATIVE
HIV, EXTERNAL: NEGATIVE
N. GONORRHEA, EXTERNAL: NEGATIVE
RUBELLA, EXTERNAL: NORMAL
T. PALLIDUM, EXTERNAL: NEGATIVE
TYPE, ABO & RH, EXTERNAL: NORMAL

## 2020-02-24 ENCOUNTER — HOSPITAL ENCOUNTER (EMERGENCY)
Age: 30
Discharge: HOME | End: 2020-02-24
Attending: EMERGENCY MEDICINE
Payer: COMMERCIAL

## 2020-02-24 ENCOUNTER — APPOINTMENT (OUTPATIENT)
Dept: ULTRASOUND IMAGING | Age: 30
End: 2020-02-24
Attending: EMERGENCY MEDICINE
Payer: COMMERCIAL

## 2020-02-24 VITALS
TEMPERATURE: 97.9 F | SYSTOLIC BLOOD PRESSURE: 108 MMHG | RESPIRATION RATE: 16 BRPM | WEIGHT: 221.78 LBS | HEART RATE: 88 BPM | OXYGEN SATURATION: 99 % | HEIGHT: 64 IN | BODY MASS INDEX: 37.86 KG/M2 | DIASTOLIC BLOOD PRESSURE: 63 MMHG

## 2020-02-24 DIAGNOSIS — M76.62 LEFT ACHILLES TENDINITIS: Primary | ICD-10-CM

## 2020-02-24 PROCEDURE — 93971 EXTREMITY STUDY: CPT

## 2020-02-24 PROCEDURE — 99283 EMERGENCY DEPT VISIT LOW MDM: CPT

## 2020-02-25 NOTE — ED NOTES
Assumed care of patient from off-going RN. Patient asleep but easily arouseable, visitor at bedside. Patient plan of care discussed with MD Rodrick Russell.

## 2020-02-25 NOTE — ED NOTES
Patient discharge instructions reviewed with patient. Patient verbalized understanding. Patient ambulatory off unit with visitor, declined wheelchair. Patient in NAD at time of discharge.

## 2020-02-25 NOTE — ED PROVIDER NOTES
EMERGENCY DEPARTMENT HISTORY AND PHYSICAL EXAM      Date: 2/24/2020  Patient Name: Nargis Meyer  Patient Age and Sex: 35 y.o. female    History of Presenting Illness     Chief Complaint   Patient presents with    Foot Swelling     L foot swelling x last night, currently 32w6d with twins; denies any pregnancy related complications at this time       History Provided By: Patient    Ability to gather history was limited by:     HPI: Melyssa Jules, 35 y.o. female 35 weeks pregnant with twins, complains of pain and mild swelling around the distal left calf and Achilles tendon, starting last night, spontaneous onset, no falls or injuries. No discomfort proximal to the top of the Achilles tendon. No shortness of breath. She reports possible mild swelling around the left calf compared to the right. No erythema. No history of DVT. Her pain is mild and aching, 4 out of 10 severity, constant, worsened by walking. Did not take any medications to alleviate the pain. Location:    Quality:      Severity:    Duration:   Timing:      Context:    Modifying factors:   Associated symptoms:     Pt denies any other alleviating or exacerbating factors. There are no other complaints, changes or physical findings at this time.      Past Medical History:   Diagnosis Date    Asthma      Past Surgical History:   Procedure Laterality Date    HX OTHER SURGICAL      Jaw surgery     HX WISDOM TEETH EXTRACTION  2006       PCP: Marvel Alvarez MD    Past History     Past Medical History:  Past Medical History:   Diagnosis Date    Asthma        Past Surgical History:  Past Surgical History:   Procedure Laterality Date    HX OTHER SURGICAL      Jaw surgery     HX WISDOM TEETH EXTRACTION  2006       Family History:  Family History   Problem Relation Age of Onset    Hypertension Mother        Social History:  Social History     Tobacco Use    Smoking status: Never Smoker    Smokeless tobacco: Never Used   Substance Use Topics  Alcohol use: No    Drug use: No       Allergies: Allergies   Allergen Reactions    Latex Rash    Other Food Anaphylaxis     [place]       Current Medications:  No current facility-administered medications on file prior to encounter. Current Outpatient Medications on File Prior to Encounter   Medication Sig Dispense Refill    ibuprofen (MOTRIN) 600 mg tablet Take 1 Tab by mouth every six (6) hours as needed for Pain. 30 Tab 6    PNV66-Iron Fumarate-FA-DSS-DHA 26-1.2- mg cap Take  by mouth daily. Indications: pregnancy         Review of Systems   Review of Systems   Constitutional: Negative for fever. Respiratory: Negative for shortness of breath. Cardiovascular: Positive for leg swelling. All other systems reviewed and are negative. Physical Exam   Vital Signs  Patient Vitals for the past 24 hrs:   Temp Pulse Resp BP SpO2   02/24/20 2230 97.9 °F (36.6 °C) 88 16 108/63 99 %   02/24/20 2047 98.4 °F (36.9 °C) 88 16 132/71 99 %       Physical Exam  Vitals signs and nursing note reviewed. Constitutional:       General: She is not in acute distress. Appearance: She is well-developed. HENT:      Head: Normocephalic and atraumatic. Mouth/Throat:      Mouth: Mucous membranes are moist.   Eyes:      General:         Right eye: No discharge. Left eye: No discharge. Conjunctiva/sclera: Conjunctivae normal.   Neck:      Musculoskeletal: Normal range of motion and neck supple. Cardiovascular:      Rate and Rhythm: Normal rate and regular rhythm. Heart sounds: Normal heart sounds. No murmur. Pulmonary:      Effort: Pulmonary effort is normal. No respiratory distress. Breath sounds: Normal breath sounds. No wheezing. Abdominal:      General: There is no distension. Palpations: Abdomen is soft. There is mass ( Gravid uterus). Tenderness: There is no abdominal tenderness. Musculoskeletal: Normal range of motion.          General: No swelling or deformity. Left lower leg: She exhibits no tenderness and no swelling. Left foot: Normal range of motion. No tenderness or swelling. Comments: No significant swelling, tenderness, or erythema of the left lower extremity or foot or ankle, compared to the right. Normal exam.  Warm and well-perfused. Palpable DP pulse. Skin:     General: Skin is warm and dry. Findings: No rash. Neurological:      Mental Status: She is alert and oriented to person, place, and time. Psychiatric:         Behavior: Behavior normal.         Thought Content: Thought content normal.         Diagnostic Study Results   Labs  No results found for this or any previous visit (from the past 24 hour(s)). Radiologic Studies  No orders to display     CT Results  (Last 48 hours)    None        CXR Results  (Last 48 hours)    None          Procedures   Procedures    Medical Decision Making     Provider Notes (Medical Decision Making):   67-year-old female, pregnant with twins in her third trimester, complaining of seemingly mild discomfort around the left Achilles tendon and distal calf, perhaps with mild swelling. No shortness of breath. No involvement of the rest of the left leg or any of the right leg. She has a normal examination, no significant swelling or erythema or tenderness on my exam.  Neurovascularly intact. No significant edema. No signs of cellulitis. Clinically doubt cellulitis or DVT. Possible tendinitis or muscle strain in the setting of increasing weight gain. No indication for x-rays. Will check DVT study with ultrasound. Given the unilateral symptoms and absence of significant swelling, can safely defer laboratories at this time. Julio Mai MD  9:20 PM    DVT study is negative, consistent with my clinical exam.  Likely uncomplicated strain or tendinitis of the left Achilles tendon. She cannot take NSAIDs secondary to pregnancy. She will simply take Tylenol as needed.   She is also recommended to obtain outpatient repeat duplex ultrasound in 1 week to conclusively rule out DVT. Yusuf Grace MD        Consults:      Medications Administered During ED Course:  Medications - No data to display       Diagnosis and Disposition     Disposition:  Discharged    Clinical Impression:   1. Left Achilles tendinitis        Attestation:  I personally performed the services described in this documentation on this date 2/24/2020 for patient Poly Bermudez. Grisel Small MD        I was the first provider for this patient on this visit. To the best of my ability I reviewed relevant prior medical records, electrocardiograms, laboratories, and radiologic studies. The patient's presenting problems were discussed, and the patient was in agreement with the care plan formulated and outlined with them. Grisel Small MD    Please note that this dictation was completed with Dragon voice recognition software. Quite often unanticipated grammatical, syntax, homophones, and other interpretive errors are inadvertently transcribed by the computer software. Please disregard these errors and excuse any errors that have escaped final proofreading.

## 2020-02-25 NOTE — ED NOTES
Bedside and Verbal shift change report given to [address] (oncoming nurse) by Stew Ford (offgoing nurse). Report included the following information SBAR, ED Summary, MAR and Recent Results.

## 2020-02-25 NOTE — DISCHARGE INSTRUCTIONS
Your ultrasound today is normal, and does not demonstrate any signs of DVT (blood clot). To fully rule out a DVT, we typically recommend that you have another ultrasound performed in about 1 week if your symptoms continue. This can be arranged by your obstetrician or primary care physician. Overall your symptoms are most consistent with strain or tendinitis of the Achilles tendon. You can safely take Tylenol, and follow-up with your primary care physician.

## 2020-02-25 NOTE — ED NOTES
Patient has bilateral leg swelling that is causing pain in both of her legs. Main reason patient came in today was the pain on the L side.

## 2020-03-12 LAB — GRBS, EXTERNAL: NEGATIVE

## 2020-04-01 ENCOUNTER — ANESTHESIA EVENT (OUTPATIENT)
Dept: LABOR AND DELIVERY | Age: 30
End: 2020-04-01
Payer: COMMERCIAL

## 2020-04-02 ENCOUNTER — HOSPITAL ENCOUNTER (INPATIENT)
Age: 30
LOS: 3 days | Discharge: HOME | End: 2020-04-05
Attending: OBSTETRICS & GYNECOLOGY | Admitting: OBSTETRICS & GYNECOLOGY
Payer: COMMERCIAL

## 2020-04-02 ENCOUNTER — ANESTHESIA (OUTPATIENT)
Dept: LABOR AND DELIVERY | Age: 30
End: 2020-04-02
Payer: COMMERCIAL

## 2020-04-02 DIAGNOSIS — Z98.891 S/P CESAREAN SECTION: Primary | ICD-10-CM

## 2020-04-02 LAB
ABO + RH BLD: NORMAL
BLOOD GROUP ANTIBODIES SERPL: NORMAL
ERYTHROCYTE [DISTWIDTH] IN BLOOD BY AUTOMATED COUNT: 18.3 % (ref 11.5–14.5)
HCT VFR BLD AUTO: 38.9 % (ref 35–47)
HGB BLD-MCNC: 12.8 G/DL (ref 11.5–16)
MCH RBC QN AUTO: 29.6 PG (ref 26–34)
MCHC RBC AUTO-ENTMCNC: 32.9 G/DL (ref 30–36.5)
MCV RBC AUTO: 89.8 FL (ref 80–99)
NRBC # BLD: 0 K/UL (ref 0–0.01)
NRBC BLD-RTO: 0 PER 100 WBC
PLATELET # BLD AUTO: 135 K/UL (ref 150–400)
PMV BLD AUTO: 11.9 FL (ref 8.9–12.9)
RBC # BLD AUTO: 4.33 M/UL (ref 3.8–5.2)
SPECIMEN EXP DATE BLD: NORMAL
WBC # BLD AUTO: 6.9 K/UL (ref 3.6–11)

## 2020-04-02 PROCEDURE — 74011250636 HC RX REV CODE- 250/636: Performed by: OBSTETRICS & GYNECOLOGY

## 2020-04-02 PROCEDURE — 77030011220 HC DEV ELECSURG COVD -B

## 2020-04-02 PROCEDURE — 36415 COLL VENOUS BLD VENIPUNCTURE: CPT

## 2020-04-02 PROCEDURE — 85027 COMPLETE CBC AUTOMATED: CPT

## 2020-04-02 PROCEDURE — 77030007866 HC KT SPN ANES BBMI -B: Performed by: ANESTHESIOLOGY

## 2020-04-02 PROCEDURE — 74011000250 HC RX REV CODE- 250: Performed by: ANESTHESIOLOGY

## 2020-04-02 PROCEDURE — 86900 BLOOD TYPING SEROLOGIC ABO: CPT

## 2020-04-02 PROCEDURE — 77030031139 HC SUT VCRL2 J&J -A

## 2020-04-02 PROCEDURE — 76010000397 HC C SECN MULTIPL FIRST 1 HR: Performed by: OBSTETRICS & GYNECOLOGY

## 2020-04-02 PROCEDURE — 74011000250 HC RX REV CODE- 250: Performed by: NURSE ANESTHETIST, CERTIFIED REGISTERED

## 2020-04-02 PROCEDURE — 74011250636 HC RX REV CODE- 250/636: Performed by: ANESTHESIOLOGY

## 2020-04-02 PROCEDURE — 77030018846 HC SOL IRR STRL H20 ICUM -A

## 2020-04-02 PROCEDURE — 76010000398 HC C SECN MULTIPL EA ADDL 0.5 HR: Performed by: OBSTETRICS & GYNECOLOGY

## 2020-04-02 PROCEDURE — 77030040361 HC SLV COMPR DVT MDII -B

## 2020-04-02 PROCEDURE — 88307 TISSUE EXAM BY PATHOLOGIST: CPT

## 2020-04-02 PROCEDURE — 74011000258 HC RX REV CODE- 258: Performed by: OBSTETRICS & GYNECOLOGY

## 2020-04-02 PROCEDURE — 77030041076 HC DRSG AG OPTICELL MDII -A

## 2020-04-02 PROCEDURE — 74011250636 HC RX REV CODE- 250/636: Performed by: NURSE ANESTHETIST, CERTIFIED REGISTERED

## 2020-04-02 PROCEDURE — 75410000003 HC RECOV DEL/VAG/CSECN EA 0.5 HR: Performed by: OBSTETRICS & GYNECOLOGY

## 2020-04-02 PROCEDURE — 77030018836 HC SOL IRR NACL ICUM -A

## 2020-04-02 PROCEDURE — 76060000078 HC EPIDURAL ANESTHESIA: Performed by: OBSTETRICS & GYNECOLOGY

## 2020-04-02 PROCEDURE — 77030040012

## 2020-04-02 PROCEDURE — 65410000002 HC RM PRIVATE OB

## 2020-04-02 PROCEDURE — 77030008459 HC STPLR SKN COOP -B

## 2020-04-02 RX ORDER — KETOROLAC TROMETHAMINE 30 MG/ML
30 INJECTION, SOLUTION INTRAMUSCULAR; INTRAVENOUS
Status: DISPENSED | OUTPATIENT
Start: 2020-04-02 | End: 2020-04-05

## 2020-04-02 RX ORDER — OXYTOCIN/RINGER'S LACTATE 20/1000 ML
125 PLASTIC BAG, INJECTION (ML) INTRAVENOUS AS NEEDED
Status: DISCONTINUED | OUTPATIENT
Start: 2020-04-02 | End: 2020-04-05 | Stop reason: HOSPADM

## 2020-04-02 RX ORDER — AMMONIA 15 % (W/V)
1 AMPUL (EA) INHALATION AS NEEDED
Status: DISCONTINUED | OUTPATIENT
Start: 2020-04-02 | End: 2020-04-05 | Stop reason: HOSPADM

## 2020-04-02 RX ORDER — MORPHINE SULFATE 10 MG/ML
6 INJECTION, SOLUTION INTRAMUSCULAR; INTRAVENOUS
Status: ACTIVE | OUTPATIENT
Start: 2020-04-02 | End: 2020-04-03

## 2020-04-02 RX ORDER — BUPIVACAINE HYDROCHLORIDE 5 MG/ML
INJECTION, SOLUTION EPIDURAL; INTRACAUDAL
Status: COMPLETED | OUTPATIENT
Start: 2020-04-02 | End: 2020-04-02

## 2020-04-02 RX ORDER — OXYTOCIN/RINGER'S LACTATE 20/1000 ML
125-1000 PLASTIC BAG, INJECTION (ML) INTRAVENOUS
Status: DISCONTINUED | OUTPATIENT
Start: 2020-04-02 | End: 2020-04-02 | Stop reason: HOSPADM

## 2020-04-02 RX ORDER — OXYTOCIN/RINGER'S LACTATE 20/1000 ML
999 PLASTIC BAG, INJECTION (ML) INTRAVENOUS AS NEEDED
Status: DISCONTINUED | OUTPATIENT
Start: 2020-04-02 | End: 2020-04-05 | Stop reason: HOSPADM

## 2020-04-02 RX ORDER — OXYCODONE AND ACETAMINOPHEN 5; 325 MG/1; MG/1
1 TABLET ORAL
Status: DISCONTINUED | OUTPATIENT
Start: 2020-04-02 | End: 2020-04-05 | Stop reason: HOSPADM

## 2020-04-02 RX ORDER — EPHEDRINE SULFATE/0.9% NACL/PF 50 MG/5 ML
SYRINGE (ML) INTRAVENOUS AS NEEDED
Status: DISCONTINUED | OUTPATIENT
Start: 2020-04-02 | End: 2020-04-02 | Stop reason: HOSPADM

## 2020-04-02 RX ORDER — CLINDAMYCIN PHOSPHATE 900 MG/50ML
900 INJECTION INTRAVENOUS
Status: COMPLETED | OUTPATIENT
Start: 2020-04-02 | End: 2020-04-02

## 2020-04-02 RX ORDER — HYDROCORTISONE 1 %
CREAM (GRAM) TOPICAL AS NEEDED
Status: DISCONTINUED | OUTPATIENT
Start: 2020-04-02 | End: 2020-04-05 | Stop reason: HOSPADM

## 2020-04-02 RX ORDER — SODIUM CHLORIDE, SODIUM LACTATE, POTASSIUM CHLORIDE, CALCIUM CHLORIDE 600; 310; 30; 20 MG/100ML; MG/100ML; MG/100ML; MG/100ML
125 INJECTION, SOLUTION INTRAVENOUS CONTINUOUS
Status: DISCONTINUED | OUTPATIENT
Start: 2020-04-02 | End: 2020-04-05 | Stop reason: HOSPADM

## 2020-04-02 RX ORDER — ONDANSETRON 2 MG/ML
4 INJECTION INTRAMUSCULAR; INTRAVENOUS
Status: DISCONTINUED | OUTPATIENT
Start: 2020-04-02 | End: 2020-04-05 | Stop reason: HOSPADM

## 2020-04-02 RX ORDER — OXYCODONE AND ACETAMINOPHEN 5; 325 MG/1; MG/1
2 TABLET ORAL
Status: DISCONTINUED | OUTPATIENT
Start: 2020-04-02 | End: 2020-04-05 | Stop reason: HOSPADM

## 2020-04-02 RX ORDER — SODIUM CHLORIDE 0.9 % (FLUSH) 0.9 %
5-40 SYRINGE (ML) INJECTION EVERY 8 HOURS
Status: DISCONTINUED | OUTPATIENT
Start: 2020-04-02 | End: 2020-04-05 | Stop reason: HOSPADM

## 2020-04-02 RX ORDER — SODIUM CHLORIDE 0.9 % (FLUSH) 0.9 %
5-40 SYRINGE (ML) INJECTION AS NEEDED
Status: DISCONTINUED | OUTPATIENT
Start: 2020-04-02 | End: 2020-04-05 | Stop reason: HOSPADM

## 2020-04-02 RX ORDER — SIMETHICONE 80 MG
80 TABLET,CHEWABLE ORAL
Status: DISCONTINUED | OUTPATIENT
Start: 2020-04-02 | End: 2020-04-05 | Stop reason: HOSPADM

## 2020-04-02 RX ORDER — DOCUSATE SODIUM 100 MG/1
100 CAPSULE, LIQUID FILLED ORAL
Status: DISCONTINUED | OUTPATIENT
Start: 2020-04-02 | End: 2020-04-05 | Stop reason: HOSPADM

## 2020-04-02 RX ORDER — DIPHENHYDRAMINE HCL 25 MG
25 CAPSULE ORAL
Status: DISCONTINUED | OUTPATIENT
Start: 2020-04-02 | End: 2020-04-05 | Stop reason: HOSPADM

## 2020-04-02 RX ORDER — MORPHINE SULFATE 0.5 MG/ML
INJECTION, SOLUTION EPIDURAL; INTRATHECAL; INTRAVENOUS
Status: COMPLETED | OUTPATIENT
Start: 2020-04-02 | End: 2020-04-02

## 2020-04-02 RX ORDER — ONDANSETRON 2 MG/ML
INJECTION INTRAMUSCULAR; INTRAVENOUS AS NEEDED
Status: DISCONTINUED | OUTPATIENT
Start: 2020-04-02 | End: 2020-04-02 | Stop reason: HOSPADM

## 2020-04-02 RX ORDER — MORPHINE SULFATE 10 MG/ML
10 INJECTION, SOLUTION INTRAMUSCULAR; INTRAVENOUS
Status: ACTIVE | OUTPATIENT
Start: 2020-04-02 | End: 2020-04-03

## 2020-04-02 RX ADMIN — Medication 20 MG: at 08:22

## 2020-04-02 RX ADMIN — SODIUM CHLORIDE, POTASSIUM CHLORIDE, SODIUM LACTATE AND CALCIUM CHLORIDE: 600; 310; 30; 20 INJECTION, SOLUTION INTRAVENOUS at 07:15

## 2020-04-02 RX ADMIN — ONDANSETRON HYDROCHLORIDE 4 MG: 2 INJECTION, SOLUTION INTRAMUSCULAR; INTRAVENOUS at 08:23

## 2020-04-02 RX ADMIN — Medication 10 ML: at 22:59

## 2020-04-02 RX ADMIN — BUPIVACAINE HYDROCHLORIDE 10 MG: 5 INJECTION, SOLUTION EPIDURAL; INTRACAUDAL; PERINEURAL at 08:14

## 2020-04-02 RX ADMIN — SODIUM CHLORIDE, SODIUM LACTATE, POTASSIUM CHLORIDE, AND CALCIUM CHLORIDE 125 ML/HR: 600; 310; 30; 20 INJECTION, SOLUTION INTRAVENOUS at 09:54

## 2020-04-02 RX ADMIN — SODIUM CHLORIDE, SODIUM LACTATE, POTASSIUM CHLORIDE, AND CALCIUM CHLORIDE 125 ML/HR: 600; 310; 30; 20 INJECTION, SOLUTION INTRAVENOUS at 16:02

## 2020-04-02 RX ADMIN — KETOROLAC TROMETHAMINE 30 MG: 30 INJECTION, SOLUTION INTRAMUSCULAR at 22:59

## 2020-04-02 RX ADMIN — SODIUM CHLORIDE, POTASSIUM CHLORIDE, SODIUM LACTATE AND CALCIUM CHLORIDE: 600; 310; 30; 20 INJECTION, SOLUTION INTRAVENOUS at 08:20

## 2020-04-02 RX ADMIN — SODIUM CHLORIDE, SODIUM LACTATE, POTASSIUM CHLORIDE, AND CALCIUM CHLORIDE 1000 ML: 600; 310; 30; 20 INJECTION, SOLUTION INTRAVENOUS at 07:15

## 2020-04-02 RX ADMIN — GENTAMICIN SULFATE 280 MG: 40 INJECTION, SOLUTION INTRAMUSCULAR; INTRAVENOUS at 08:46

## 2020-04-02 RX ADMIN — CLINDAMYCIN PHOSPHATE 900 MG: 900 INJECTION, SOLUTION INTRAVENOUS at 07:50

## 2020-04-02 RX ADMIN — MORPHINE SULFATE 0.25 MG: 0.5 INJECTION, SOLUTION EPIDURAL; INTRATHECAL; INTRAVENOUS at 08:14

## 2020-04-02 RX ADMIN — Medication 999 ML/HR: at 08:43

## 2020-04-02 NOTE — ANESTHESIA POSTPROCEDURE EVALUATION
Procedure(s):   SECTION MULTIPLE. spinal    Anesthesia Post Evaluation        Patient location during evaluation: PACU  Patient participation: complete - patient participated  Level of consciousness: awake and alert  Pain management: adequate  Airway patency: patent  Anesthetic complications: no  Cardiovascular status: acceptable  Respiratory status: acceptable  Hydration status: acceptable  Comments: I have seen and evaluated the patient and is ready for discharge.  Bruna Rivero MD    Post anesthesia nausea and vomiting:  none      Vitals Value Taken Time   /59 2020  9:13 AM   Temp     Pulse 71 2020  9:13 AM   Resp 18 2020  9:13 AM   SpO2 99 % 2020  9:13 AM

## 2020-04-02 NOTE — OP NOTES
Operative Note    Name: [facility] Record Number: [id number]   YOB: 1990  Today's Date: 2020      Pre-operative Diagnosis: , TWINS; A breech    Post-operative Diagnosis: same but delivered    Operation: low transverse  section Procedure(s):   SECTION MULTIPLE    Surgeon(s):  MD Abhilash Mccord MD    Anesthesia: Spinal    Prophylactic Antibiotics: clindamycin and gentamicin  DVT Prophylaxis: Sequential Compression Devices         Fetal Description: multiple gestation 2     Birth Information:   Information for the patient's :  Maggie Guillermo [[id number]]   Delivery of a   male infant on 2020 at 8:42 AM. Apgars were 9  and 9 . Umbilical Cord: 3 Vessels     Umbilical Cord Events: None     Placenta: Expressed removal with Normal;Intact appearance. Amniotic Fluid Volume: Moderate     Amniotic Fluid Description:  Clear    Information for the patient's :  Lynette Sargent [[id number]]   Delivery of a   male infant on 2020 at 8:43 AM. Apgars were 9  and 9 . Umbilical Cord:       Umbilical Cord Events: None     Placenta: Expressed removal with Normal;Intact appearance. Amniotic Fluid Volume: Moderate     Amniotic Fluid Description:  Clear        Umbilical Cord: normal - 1 tag on A, 2 tags on B    Placenta:  expressed    Estimated Blood Loss (ml):  500    Specimens: placenta    Implants: none           Complications:  none    Procedure Detail:      After proper patient identification and consent, the patient was taken to the operating room, where spinal anesthesia was administered and found to be adequate. Orozco catheter had been placed using sterile technique. The patient was prepped and draped in the normal sterile fashion. The abdomen was entered using the Pfannenstiel technique. The peritoneum was entered well superior to the bladder without any apparent injury.  The bladder flap was created without difficulty. A low transverse uterine incision was made with the scalpel and extended. Amniotomy was performed and the fluid was clear. The babys foot was then delivered atraumatically, followed by rest of body w standard breech maneuvers. The nose and mouth were suctioned. The cord was clamped and cut and the baby was handed off to Nursing staff in attendance. The second sac was then ruptured, with clear fluid, vertex was delivered without difficulty. Placenta was then removed from the uterus. The uterus was curettaged with a moist lap pad and cleared of all clots and debris. The uterine incision was closed with 0 Vicryl  in running locking fashion with good hemostasis assured. A second imbricating layer was placed. Good hemostasis was again reassured and the uterus was returned to the abdomen. The pelvis was irrigated with warm normal saline and good hemostasis was again reassured throughout. The fascia was closed with 0 Vicryl in a running fashion. Good hemostasis was assured. The skin was closed with a insorb closure. The patient tolerated the procedure well. Sponge, lap, and needle counts were correct times three and the patient and baby were taken to recovery/postpartum room in stable condition.     José Miguel Sanabria MD  April 2, 2020  9:07 AM

## 2020-04-02 NOTE — ROUTINE PROCESS
SBAR IN Report: Mother Verbal report received from [address] [place] [address] Drive RN (full name & credentials) on this patient, who is now being transferred from L&D (unit) for routine progression of care. The patient is not wearing a green \"Anesthesia-Duramorph\" band. Report consisted of patient's Situation, Background, Assessment and Recommendations (SBAR). Louise ID bands were compared with the identification form, and verified with the patient and transferring nurse. Information from the SBAR and the [place] Report was reviewed with the transferring nurse; opportunity for questions and clarification provided.

## 2020-04-02 NOTE — L&D DELIVERY NOTE
Delivery Summary  Patient: Nathalie Treviño             Circumcision:   desires  Additional Delivery Comments - twins CS      Information for the patient's :  Wendy Maddox [[id number]]       Labor Events:    Labor: No    Steroids: None   Cervical Ripening Date/Time:       Cervical Ripening Type: None   Antibiotics During Labor: No   Rupture Identifier: Sac 1    Rupture Date/Time: 2020 8:42 AM   Rupture Type: AROM   Amniotic Fluid Volume: Moderate    Amniotic Fluid Description: Clear    Amniotic Fluid Odor:      Induction: None       Induction Date/Time:        Indications for Induction:      Augmentation: None   Augmentation Date/Time:      Indications for Augmentation:     Labor complications: None       Additional complications:        Delivery Events:  Indications For Episiotomy:     Episiotomy:     Perineal Laceration(s):     Repaired:     Periurethral Laceration Location:      Repaired:     Labial Laceration Location:     Repaired:     Sulcal Laceration Location:     Repaired:     Vaginal Laceration Location:     Repaired:     Cervical Laceration Location:     Repaired:     Repair Suture:     Number of Repair Packets:     Estimated Blood Loss (ml):  ml   Quantitative Blood Loss (ml)                Delivery Date: 2020    Delivery Time: 8:42 AM  Delivery Type: , Low Transverse  Sex:  Male    Gestational Age: 36w4d   Delivery Clinician:  Zakia Adam  Living Status: Living   Delivery Location: L&D OR 2          APGARS  One minute Five minutes Ten minutes   Skin color: 1   1        Heart rate: 2   2        Grimace: 2   2        Muscle tone: 2   2        Breathin   2        Totals: 9   9            Presentation: Breech    Position:        Resuscitation Method:  Tactile Stimulation     Meconium Stained: None      Cord Information: 3 Vessels  Complications: None  Cord around:    Delayed cord clamping?  Yes  Cord clamped date/time:2020  8:43 AM  Disposition of Cord Blood: Lab    Blood Gases Sent?: No    Placenta:  Date/Time: 2020  8:44 AM  Removal: Expressed      Appearance: Normal;Intact     Theodore Measurements:  Birth Weight:        Birth Length:        Head Circumference:        Chest Circumference:       Abdominal Girth: Other Providers:   LISA MORRISSEY, Obstetrician;Primary Nurse;Primary  Nurse       Information for the patient's :  Nancy Morris [[id number]]       Labor Events:    Labor: No    Steroids: None   Cervical Ripening Date/Time:       Cervical Ripening Type: None   Antibiotics During Labor: No   Rupture Identifier: Sac 1    Rupture Date/Time: 2020 8:43 AM   Rupture Type: AROM   Amniotic Fluid Volume:  Moderate    Amniotic Fluid Description: Clear    Amniotic Fluid Odor: None    Induction: None       Induction Date/Time:        Indications for Induction:      Augmentation: None   Augmentation Date/Time:      Indications for Augmentation:     Labor complications: None       Additional complications:        Delivery Events:  Indications For Episiotomy:     Episiotomy:     Perineal Laceration(s):     Repaired:     Periurethral Laceration Location:      Repaired:     Labial Laceration Location:     Repaired:     Sulcal Laceration Location:     Repaired:     Vaginal Laceration Location:     Repaired:     Cervical Laceration Location:     Repaired:     Repair Suture:     Number of Repair Packets:     Estimated Blood Loss (ml):  ml   Quantitative Blood Loss (ml)                Delivery Date: 2020    Delivery Time: 8:43 AM  Delivery Type: , Low Transverse  Sex:  Male    Gestational Age: 36w4d   Delivery Clinician:  Kem Sanots  Living Status: Living   Delivery Location: L&D OR 2          APGARS  One minute Five minutes Ten minutes   Skin color: 1   1        Heart rate: 2   2        Grimace: 2   2        Muscle tone: 2   2        Breathin   2 Totals: 9   9            Presentation: Vertex    Position:        Resuscitation Method:  Suctioning-bulb; Tactile Stimulation     Meconium Stained: None      Cord Information:    Complications: None  Cord around:    Delayed cord clamping? Yes  Cord clamped date/time:2020  8:44 AM  Disposition of Cord Blood: Lab    Blood Gases Sent?: No    Placenta:  Date/Time: 2020  8:44 AM  Removal: Expressed      Appearance: Normal;Intact     [place] Measurements:  Birth Weight:        Birth Length:        Head Circumference:        Chest Circumference:       Abdominal Girth:       Other Providers:   Shemar Almazan, Obstetrician;Primary Nurse;Primary  Nurse           Cord pH:  none    Episiotomy:      Lili Harley [[id number]]         Julianna Price [[id number]]        Laceration(s):      Lili Harley [[id number]]         Julianna Price [[id number]]          Estimated Blood Loss (ml):     Labor Events  Method:      Lili Harley [[id number]]   None     Julianna Price [[id number]]   None       Augmentation:      Lili Harley [[id number]]   None     Julianna Price [[id number]]   None    Cervical Ripening:      Lili Harley [[id number]]         Julianna Price [[id number]]            Harleen Triplett  [[id number]]         Julianna Price [[id number]]            Harleen Nguyen [[id number]]   None     Julianna Price [[id number]]   None         Hospital Problems  Never Reviewed          Codes Class Noted POA    Twins live born in hospital ICD-10-CM: Z38.30  ICD-9-CM: V33.00  2020 Unknown              Operative Vaginal Delivery - none    Group B Strep:   Lab Results   Component Value Date/Time    GrBStrep, External negative 2020     Information for the patient's :  Lili Harley [[id number]]   No results found for: ABORH, PCTABR, PCTDIG, BILI, ABORHEXT, [address]  Information for the patient's :  Isela Leslie [[id number]]   No results found for: ABORH, PCTABR, PCTDIG, BILI, ABORHEXT, ABORH    No results found for: APH, APCO2, APO2, AHCO3, ABEC, ABDC, O2ST, EPHV, PCO2V, PO2V, HCO3V, EBEV, EBDV, SITE, RSCOM

## 2020-04-02 NOTE — ANESTHESIA PREPROCEDURE EVALUATION
Relevant Problems   No relevant active problems       Anesthetic History   No history of anesthetic complications            Review of Systems / Medical History  Patient summary reviewed, nursing notes reviewed and pertinent labs reviewed    Pulmonary  Within defined limits          Asthma        Neuro/Psych   Within defined limits           Cardiovascular  Within defined limits                     GI/Hepatic/Renal  Within defined limits              Endo/Other  Within defined limits           Other Findings              Physical Exam    Airway  Mallampati: II  TM Distance: > 6 cm  Neck ROM: normal range of motion   Mouth opening: Normal     Cardiovascular  Regular rate and rhythm,  S1 and S2 normal,  no murmur, click, rub, or gallop             Dental  No notable dental hx       Pulmonary  Breath sounds clear to auscultation               Abdominal  GI exam deferred       Other Findings            Anesthetic Plan    ASA: 2  Anesthesia type: spinal            Anesthetic plan and risks discussed with: Patient

## 2020-04-02 NOTE — H&P
Print      Patient  Name Steffen Garcia (84CR, F) ID# [id number] Appt. Date/Time 2020 04:15PM    1990 [address] [facility] Office   Provider Jalil Payan MD   Insurance Med Primary: Janis Valero (POS)  Insurance # : [id number]  Policy/Group # : [id number]  Employer Name : [employer]  Prescription: [address] - Member is eligible. details   Chief Complaint  Prenatal Visit Non-Routine, OB visit  Patient's Care Team  OB/GYN: Ariel Hammond MD: [address] [facility], [place], [address], Ph [phone], Fax [phone] NPI: [id number]  Primary Care Provider: NONE (NO PREFERRED PCP): [address]  Patient's Pharmacies  [address]): [facility], [facility], Ph [phone], Fax [phone]  2020 04:09 pm  Wt: 226 lbs (102.51 kg) BP: 126/78     Allergies  Allergies not reviewed (last reviewed 2020)     AMOXICILLIN: Hives (Moderate to severe)    LATEX: Hives (Moderate)    WOLFGANG: Facial swelling (Severe)    PENICILLIN G POT IN DEXTROSE: Hives (Moderate to severe)    Medications  Reviewed Medications     Baby Aspirin 20   entered Romelia Rodriguez   Claritin 09/10/19   entered Natacha Rodas   EPINEPHrine 0.3 mg/0.3 mL injection, auto-injector  Take 1 auto(s) as needed by injection route. 10/07/19   prescribed Jeffrey Blackwell MD   folic acid 1 mg tablet  Take 1 tablet(s) every day by oral route. 10/07/19   prescribed Jeffrey Blackwell MD   iron 20   entered Romelia Rodriguez   Prenatal Vitamin 27 mg iron-800 mcg tablet  Prescribed by non 395/780 Evon Whelan MD  Internal Note: Entered By: Kev Donohue MASigned By: Arabella Johnston MDUncoded: NBMN: N, start 2017   filled rshasusan. [id number]   Probiotic 20   entered Romelia Maguire HFA 90 mcg/actuation aerosol inhaler  1-2 puffs every 4-6 hours as needed for wheezing  Internal Note: rarely needed 10/07/19 prescribed Jeffrey Blackwell MD   Vaccines  Vaccines not reviewed (last reviewed 2020)    Vaccine Type Date Amt. Route Site Jacey Walker Lot # Mfr. Exp.   Date Date  on VIS VIS  Given Vaccinator   Diphtheria, Tetanus, Pertussis   Tdap 20 0.5 mL Intramuscular Deltoid, Left  3YN2L GlaxoSmithKline 03/12/21 02/24/15 01/20/20 Sophie Gorman                                                     Tdap 20 0.5 mL Intramuscular Deltoid, Left  3YN2L GlaxoSmithKline 03/12/21 02/24/15 01/20/20 Tori Covert                                                     Tdap 18 0.5 mL     GlaxoSmithKline 03/18/19 02/24/15  Colten MUNSON - Team 4 SHP                                                     HPV   HPV, quadrivalent 17                                                               Hepatitis B   Hep B 17                                                               Influenza   influenza, injectable, quadrivalent, preservative free 10/07/19 0.5 mL Intramuscular Deltoid, Left  [id number] Seqirus 06/30/20 08/15/19 10/07/19 Darlin Adinassa                                                     influenza, injectable, quadrivalent, preservative free 10/26/17 0.5 mL    [id number] GlaxoSmithKline 06/30/18 08/07/15  Maira Melendez LPN - Team 2 WST                                                     Problems  Reviewed Problems     Hearing loss - Unknown etiology; testing inconclusive   Family history of breast cancer - Onset: 2018 - Entered By: Ute Woodward - Team WSTSigned By: Arabella Johnston MD Description: Family History of Breast Cancer code: V16.3    Primary  [id number] 8:00am [facility], [facility] - [id number] at 9:00am  Family History  Family History not reviewed (last reviewed 2020)    Paternal Grandmother - Malignant tumor of breast   Social History  Social History not reviewed (last reviewed 2020)  OB/GYN Social History  Chewing tobacco: none  Tobacco Smoking Status: Never smoker  Smokeless Tobacco Status: Never used smokeless tobacco  E-cigarette/Vape Status: Never used electronic cigarettes  Marital status:   Number of children: 1 (Notes: daughter, Leatha('18\")  Are you working: Yes  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 Frequency of Cycle (Q days): 28. Duration of Flow (days): 6.  Flow: Moderate. Menses Monthly: Y. Menstrual Cramps: moderate. Premenstrual Syndrome: Y. Date of Last Pap Smear: 2017 (Notes: NIL). Abnormal Pap: N. Any Treatment for Abnormal Pap?: N. Age at Menarche: 7. HPV Vaccine: Y. Sexually Active?: Y.  STIs/STDs: N.  Current Birth Control Method: Pregnant. Obstetric History  Obstetric History not reviewed (last reviewed 2020)    TOTAL FULL PRE AB. I AB. S ECTOPICS MULTIPLE LIVING   2 1      1    x1  Past Pregnancies  Date # Fetuses GA Wks Labor Length Birth Weight Sex Delivery Type Outcome Anesthesia Delivery Place  Labor Notes Source   2018 1 40.5  7 lbs. 4.99 oz.  F Vaginal delivery Full Term Birth    [facility], Noelle Parsons, apgars 7/8 \"Leatha\" historical   Pregnancy Problem List   Fatigue - Hgb__10.4/30.5   Asymptomatic bacteriuria in pregnancy - Macrobid 10/9, LUCILA neg   Hearing loss - Unknown etiology; testing inconclusive   Dichorionic diamniotic twin pregnancy - PNV+Folic Acid 65RF scan-nrml 20wk with echo- ASA 81mg    Primary  [id number] 8:00am [facility] Dr. Steven Trujillo now, PAT - [id number] at 9:00am  Genetic Screening and Infection History  319: Mental Retardation/Autism: Y, developmental delay: sister, agenesis corpus collosum. Patient's Age Will Be 28 Years Or Older At Estimated Date of Delivery: N  2824: Thalassemia ([place], [place], Mediterranean, Or  Background): MCV < 80: N  Neural Tube Defect (Meningomyelocele, Spina Bifida, Or Anencephaly): N  746: Congenital Heart Defect: N  7580: Down Syndrome: N  Manuel-Sachs (eg, Lorena Méndez, [facility]): N  Canavan Disease: N  282: Sickle Cell Disease Or Trait (): N  Hemophilia Or Other Blood Disorders: N  359: Muscular Dystrophy: N  2770: Cystic Fibrosis: N  18: Screven's Chorea: N  If Yes, Was Person Tested For Fragile X?: N  Other Inherited Genetic Or Chromosomal Disorder: N  Maternal Metabolic Disorder (eg, Type 1 Diabetes, PKU): N  Patient Or [de-identified] Father Had A Child With Birth Defects Not Listed Above: N  Recurrent Pregnancy Loss, Or A Stillbirth: N  Medications (including Supplements, Vitamins, Herbs, OTC Drugs), Illicit/Recreational Drugs, Alcohol: N  If Yes, Agent(s) And Strength/Dosage: N  Any Other Genetic History: N  Live With Someone With TB Or Exposed To TB: N  Patient Or Partner Has History Of Genital Herpes: N  Rash Or Viral Illness Since Last Menstrual Period: N  History Of STD, Gonorrhea, Chlamydia, HPV, Syphilis: N  Other Infection History: N  History of HIV: N  History of Hepatitis: N  Prior GBS-infected child: N  Prenatal Flowsheet  Fundus Pres (A) Pres (B) FHR (A) FHR (B) FM (A) FM (B) PLS Cervix Exam BP Wt Edema Glucose Protein Leukocytes Nitrite Ketones Blood   09/10/2019   9 wks   [email or web address]                                178 183 No No   108/60 173 lbs none none neg       Comments: Di-Di TWINS! Routine prenatal labs. CF/SMA/NT/Mat21/CVS/Amnio declined. Considering tetra.  Hearing impaired - has seen a genetic counselor at [address] [place] [address] and had testing; no known genetic cause/testing inconclusive; declines genetic counseling here. reports: nausea (declines meds), fatigue; denies: headache, cramping , spotting, vomiting   10/07/2019   12 wks 6 days   btozer                                164 160 No No   120/70 178 lbs none         Comments: Feeling well. Di/di twin pregnancy. Recommend additional folic acid. Encouraged the flu shot. Reviewed vaginal vs.  - she'd like to try vaginal. urine culture today. Viability confirmed with vscan. Reviewed plan of care. 2019   16 wks 6 days   btozer                             20 cm   153 151 Yes Yes   128/80 187 lbs none none neg       Comments: Rm 19-- US prior, normal anatomy and CL. 2 boys! Struggling with constipation-it's been 1.5wks since she had a good BM. Fiber, water, will resume sennekot (rx last pregnancy). Today- start miralax once daily until she sees results. Doing well, +headaches when pt does not eat. Traveling to [place] in 2 weeks - reviewed risks, including DVT (drew hose, hydration, frequent ambulation), risk of pregnancy complications and needing medical care abroad. Declines all genetic testing. start ASA 81mg   2019   16 wks 6 days                                                   2019   20 wks 6 days                                  157 Present 153 Present Yes Yes   128/70 191 lbs trace none neg       Comments: US prior, normal fetal scan and echo x2, CL 48mm. PTL precautions reviewed. urine LUCILA today- pt asymptomatic. Recommend she start Aspirin 81mg daily. Has experienced more fatigue, a couple times at the end of the day she has felt she didn't have the energy to get off the couch. Will check Hgb today.    2019     btozer                                                 2019   24 wks 6 days                                           none neg       Comments: Urine culture collected in ultrasound   2019   24 wks 6 days   avaclavik                              Transverse Transverse Present Present Yes Yes   102/72 200 lbs trace none neg       Comments: EFW A 762g/48% B 847g/78%. Doing well. good fm. swelling increasing in hands and ankles, having more constipation -- reviewed senna works well, other options discussed. UCx LUCILA today. RTC 4 weeks. Glucola next appt.   2020   27 wks 6 days                                  140 147        none neg       2020   27 wks 6 days   avaclavik                                140 147     122/74 210 lbs  none neg       Comments: EFW A 1314g/79% B 1471g/96%. Discordance 10.7%. CL 42mm. GTT today. Tdap administered. Doing well. Feeling fatigued. No nausea or vomiting. No spotting or bleeding. No headaches. PTL precautions reviewed. Wants breast pump but will hold off until later gestation given twins. RTC 2 weeks. 2020   30 wks   avaclavik                                139 144 Yes Yes   112/74 213 lbs 1+         Comments: Very tired, some nausea but nothing she's worried about, no bleeding or spotting. Increase in swelling in hands. Unable to leave urine before apt. Reviewed delivery plan at 38w. Delivery possibilities reviewed. RTC 2 weeks -- reviewed starting  testing.   2020   32 wks                                  148 148 Yes Yes   122/74 217 lbs 1+ none neg       Comments: US prior- A: breech on maternal left, EFW 1882g (39th%ile); B: vertex on maternal right, EFW 1818g (30th%ile), concordant 3.4%. Cervical length 34mm. Patient complains of increased swelling in hands and feet. Reviewed compression stockings. Denies HA, changes in vision, RUQ pain. Patient denies abnormal discharge, bleeding,spotting, headaches and N/V. Reviewed claritin ok for allergies. RTC 1 week for weekly testing.   2020     [email or web address]                                                 2020   33 wks 1 days   btozer                              Breech Vertex 150 155 Yes Yes   120/78 219 lbs 2+ none neg       Comments: US after visit, NST today. Doing well, increase of edema in feet. Babies are active. Breech, vtx/tr today, will go ahead and schedule a  for  (38w2d) Reactive NST x2. Has a breast pump at home and will hold off on an Rx in case she needs to rent a hospital grade pump. Cont weekly surveillance   2020   34 wks 2 days   avaclavik                              Breech Vertex 134 141 Yes Yes   128/74 224 lbs 1+ none neg       Comments: BPP 10/10 x2. Doing well. Plan GBS next appt. Pre-reg info given. Reviewed in brief postpartump restrictions. RTC 1 week.   2020   35 wks 2 days   avaclavik                              Breech  129 142 Yes Yes   124/86 224 lbs 2+         Comments: A: BPP 8/10 (-2 breathing) B: 10/10. and GBS swab today (PCN allergy). Repeat BPP/NST Monday/Tuesday. Doing well otherwise.   2020   35 wks 2 days                                           none neg       2020   35 wks 2 days                                           none neg       2020   36 wks   dnoffsinger                              Breech Vertex 142 154 Yes Yes   118/78 224 lbs 1+ none trace       Comments: RM 11: BPP 8/8 x2. Appropriate interval growth, concordant. Br/Vtx, has primary  already scheduled. BS already done. No N/V or headaches. No urinary complaints. No V/B or LOF. Questions regarding  answered, labor precautions reviewed. Continue weekly testing.   2020   37 wks 2 days   eschoeffler                              Breech Vertex 137 142 Yes Yes   126/78 226 lbs 1+ none neg       Comments: US prior, NST today reactive x 2. (patient hearing impaired, needs to visualize person speaking). Doing well, contractions on/off. Babies are active BPP 8/8 x 2 normal KIKE and dopplers c/s set for next week   OB Lab Results    Result Value Ref.  Range Date Collected Date Reviewed Reviewed By Note   Initial Labs             Blood Type O  09/10/2019 2019 [email or web address]        D (Rh) Type Positive  09/10/2019 2019 Rhoda Cameron Antibody Screen Negative NEGATIVE 09/10/2019 09/12/2019 [email or web address]        Antibody Screen Negative NEGATIVE 01/20/2020 01/22/2020 avaclavik        HCT - Initial 38.5 % 34.0-46.6 09/10/2019 09/12/2019 [email or web address]        HCT - Initial 36.4 % 34.0-46.6 12/02/2019 12/03/2019 btozer        HGB - Initial 12.8 g/dL 11.1-15.9 09/10/2019 09/12/2019 [email or web address]        HGB - Initial 11.5 g/dL 11.1-15.9 12/02/2019 12/03/2019 btozer        MCV - Initial 90 fL 79-97 09/10/2019 09/12/2019 [email or web address]        MCV - Initial 90 fL 79-97 12/02/2019 12/03/2019 btozer        PLT - Initial 295 x10E3/uL 150-450 09/10/2019 09/12/2019 [email or web address]        PLT - Initial 284 x10E3/uL 150-450 12/02/2019 12/03/2019 btozer        VDRL - Initial   09/10/2019 09/12/2019 [email or web address]        Urine Culture/Screen Escherichia coli  10/07/2019 10/09/2019 btozer        Urine Culture/Screen Comment  12/30/2019 01/06/2020 btozer        HBsAg Negative NEGATIVE 09/10/2019 09/12/2019 [email or web address]        HIV Counseling/Testing Non Reactive NON REACTIVE 09/10/2019 09/12/2019 [email or web address]        Chlamydia - Initial Negative NEGATIVE 09/10/2019 09/12/2019 [email or web address]        Gonorrhea - Initial Negative NEGATIVE 09/10/2019 09/12/2019 [email or web address]        Varicella             Rubella 12.20 index IMMUNE >0.99 09/10/2019 09/12/2019 [email or web address]    Optional Labs             HGB Electrophoresis             PPD/Quanta             Pap Test             Cystic Fibrosis             HPV             Manuel-Sachs             Familial Dysautonomia             Genetic Screening Tests             NST             TSH             Drug screen             HCV Ab             HCV RNA             Urinalysis             Rhogam Injection         8-20 Week Labs             Ultrasound - Initial             1st Trimester Aneuploidy Risk Assessment             MSAFP/Multiple Markers             2nd Trimester Serum Screening             Amnio/CVS             Karyotype             Amniotic Fluid (AFP)         24-28 Week Labs HCT - 24-28 Weeks 30.5 % 34.0-46.6 01/20/2020 01/22/2020 avaclavik        HGB - 24-28 Weeks 10.4 g/dL 11.1-15.9 01/20/2020 01/22/2020 avaclavik        MCV - 24-28 Weeks             PLT - 24-28 Weeks 259 x10E3/uL 150-450 01/20/2020 01/22/2020 avaclavik        Diabetes Screen 103 mg/dL  01/20/2020 01/22/2020 avaclavik        GTT (If Screen Abnormal)             D (Rh) Antibody Screen         32-36 Week Labs             HCT - 32-36 Weeks             HGB - 32-36 Weeks             MCV - 32-36 Weeks             PLT - 32-36 Weeks             Ultrasound - 32-36 Weeks             HIV (When Indicated)             VDRL - 32-36 Weeks   01/20/2020 01/22/2020 avaclavik        Gonorrhea - 32-36 Weeks             Chlamydia - 32-36 Weeks             Depression screening (when indicated)         35-37 Week Labs             Group B Strep   03/12/2020 03/14/2020 avaclavik        Resistance testing if penicillin allergic         Other Labs             Other         Past Medical History  Past Medical History not reviewed (last reviewed 03/12/2020)  Asthma: Y  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 Other Concerns: none  ROS  Additionally reports: Except as noted in the HPI, the review of systems is negative for General, Breast, , Resp, GI, CV, Endo, MS, Psych and Heme. Physical Exam  Patient is a 32-year-old female. Constitutional: General Appearance: no acute distress. Mental Status Findings oriented to time, place, and person and appropriate mood. Head: Head: normocephalic. Respiratory: Lungs unlabored respiratory effort.     Abdomen: Inspection and Palpation: gravid abdomen. Female : Cervix: as documented in prenatal flowsheet. Extremities: Extremities: no edema. Skin: General Skin no rash or suspicious lesions and normal general appearance. Procedure Documentation  NST Two Fetus:  Fetus 1:  FHR Baseline: 135  Variability: Moderate  Accelerations: Present  Decelerations: Absent  Interpretation: Reactive  Comments:  Fetus 2:  FHR Baseline: 145  Variability: Moderate  Accelerations: Present  Decelerations: Absent  Interpretation: Reactive  Comments:  Assessment / Plan  1. Dichorionic diamniotic twin pregnancy  O30.041: Twin pregnancy, dichorionic/diamniotic, first trimester  O30.042: Twin pregnancy, dichorionic/diamniotic, second trimester  O30.043: Twin pregnancy, dichorionic/diamniotic, third trimester    2. Gestation period, 37 weeks  Z3A.37: 37 weeks gestation of pregnancy      Return to [facility] [address] [address] [facility],[address] for Testing at [facility] on 03/26/2020 at 04:00 PM  [address], MD for Surgery at [facility] () on 04/02/2020 at 08:00 AM  Encounter Sign-Off  Encounter signed-off by Severo Daniel, MD, 03/26/2020. Encounter performed and documented by Severo Daniel, MD  Encounter reviewed & signed by Severo Daniel, MD on 03/26/2020 at 4:24pm    There is not enough information to calculate an E&M code      Audit history   Date of Surgery Update:  Jomar Ear was seen and examined. History and physical has been reviewed. The patient has been examined.  There have been no significant clinical changes since the completion of the originally dated History and Physical.    Signed By: Ishan Lugo MD     April 2, 2020 7:26 AM

## 2020-04-02 NOTE — LACTATION NOTE
This note was copied from a baby's chart. Initial Lactation Consultation: twin boys born this morning via C/S to a  mom at 45 2/7 weeks gestation. Mom nursed her first child with good supply, but states it was very difficult in the beginning. Mom is hard of hearing and is a lip reader. I assisted mom with latching mom in the biologic/laid back position. Baby B Yarely Espino) latched deeply with rhythmic sucking and occasional swallowing noted. Baby A (Steve) was very sleepy and failed to latch at this visit and is doing skin to skin with mom. Melissa Manning has latched well previously. Mom is very tired and has a hard time staying awake during my visit. Dad is present, watching over the babies. Feeding Plan: Mother will keep baby skin to skin as often as possible, feed on demand, 8-12x/day , respond to feeding cues, obtain latch, listen for audible swallowing, be aware of signs of oxytocin release/ cramping,thirst,sleepiness while breastfeeding, offer both breasts,and will not limit feedings. Mother agrees to utilize breast massage while nursing to facilitate lactogenesis. 1614 Assisted in waking Baby A (Steve), deep latch noted with occasional swallowing. Discussed importance of deep latch to moms comfort and infant ability to transfer colostrum.

## 2020-04-02 NOTE — PROGRESS NOTES
2020  6:25 AM 36 YO  with twin gestation at 38.2 weeks for primary  section, breech vtx presentation with Guido Galvan. Admission init. Temp WNL and covid screen negative. Pt to bathroom to change inito gown and use CHG wipes  0715 PIV and labs. 0730 Admission completed with assistance from , pt has hearing loss  0808 Pt to OR 2 per Dr. Sweta Tee and Guido Garcia. Report to CRNA  3224 Pt rec in recovery, report from [facility] REPORT:    Verbal report given to ISIDRO Ren RN(name) on Carolynn Urbina  being transferred to 341 MIU(unit) for routine post - op       Report consisted of patients Situation, Background, Assessment and   Recommendations(SBAR). Information from the following report(s) SBAR, Kardex, ED Summary, OR Summary, Procedure Summary, Intake/Output, MAR, Accordion, Recent Results and Med Rec Status was reviewed with the receiving nurse. Lines:   Peripheral IV 20 Anterior; Left Forearm (Active)        Opportunity for questions and clarification was provided.       Patient transported with:   Registered Nurse

## 2020-04-02 NOTE — ANESTHESIA PROCEDURE NOTES
Spinal Block    Start time: 4/2/2020 8:14 AM  End time: 4/2/2020 8:18 AM  Performed by: Guillermo Grande MD  Authorized by: Guillermo Grande MD     Pre-procedure:   Indications: primary anesthetic  Preanesthetic Checklist: risks and benefits discussed and timeout performed    Timeout Time: 08:14          Spinal Block:   Patient Position:  Seated    Prep: Betadine      Location:  L3-4  Technique:  Single shot        Needle:   Needle Type:  Pencil-tip  Needle Gauge:  25 G  Attempts:  1      Events: CSF confirmed, no blood with aspiration and no paresthesia        Assessment:  Insertion:  Uncomplicated  Patient tolerance:  Patient tolerated the procedure well with no immediate complications

## 2020-04-03 LAB
BASOPHILS # BLD: 0 K/UL (ref 0–0.1)
BASOPHILS NFR BLD: 0 % (ref 0–1)
DIFFERENTIAL METHOD BLD: ABNORMAL
EOSINOPHIL # BLD: 0 K/UL (ref 0–0.4)
EOSINOPHIL NFR BLD: 0 % (ref 0–7)
ERYTHROCYTE [DISTWIDTH] IN BLOOD BY AUTOMATED COUNT: 18.5 % (ref 11.5–14.5)
HCT VFR BLD AUTO: 37.9 % (ref 35–47)
HGB BLD-MCNC: 12.3 G/DL (ref 11.5–16)
IMM GRANULOCYTES # BLD AUTO: 0.1 K/UL (ref 0–0.04)
IMM GRANULOCYTES NFR BLD AUTO: 1 % (ref 0–0.5)
LYMPHOCYTES # BLD: 1.3 K/UL (ref 0.8–3.5)
LYMPHOCYTES NFR BLD: 14 % (ref 12–49)
MCH RBC QN AUTO: 29.4 PG (ref 26–34)
MCHC RBC AUTO-ENTMCNC: 32.5 G/DL (ref 30–36.5)
MCV RBC AUTO: 90.5 FL (ref 80–99)
MONOCYTES # BLD: 0.7 K/UL (ref 0–1)
MONOCYTES NFR BLD: 7 % (ref 5–13)
NEUTS SEG # BLD: 7.1 K/UL (ref 1.8–8)
NEUTS SEG NFR BLD: 78 % (ref 32–75)
NRBC # BLD: 0 K/UL (ref 0–0.01)
NRBC BLD-RTO: 0 PER 100 WBC
PLATELET # BLD AUTO: 106 K/UL (ref 150–400)
PMV BLD AUTO: 12 FL (ref 8.9–12.9)
RBC # BLD AUTO: 4.19 M/UL (ref 3.8–5.2)
WBC # BLD AUTO: 9.2 K/UL (ref 3.6–11)

## 2020-04-03 PROCEDURE — 74011250636 HC RX REV CODE- 250/636: Performed by: OBSTETRICS & GYNECOLOGY

## 2020-04-03 PROCEDURE — 74011250636 HC RX REV CODE- 250/636: Performed by: ANESTHESIOLOGY

## 2020-04-03 PROCEDURE — 85025 COMPLETE CBC W/AUTO DIFF WBC: CPT

## 2020-04-03 PROCEDURE — 65410000002 HC RM PRIVATE OB

## 2020-04-03 PROCEDURE — 36415 COLL VENOUS BLD VENIPUNCTURE: CPT

## 2020-04-03 PROCEDURE — 74011250637 HC RX REV CODE- 250/637: Performed by: OBSTETRICS & GYNECOLOGY

## 2020-04-03 RX ORDER — IBUPROFEN 400 MG/1
800 TABLET ORAL
Status: DISCONTINUED | OUTPATIENT
Start: 2020-04-03 | End: 2020-04-05 | Stop reason: HOSPADM

## 2020-04-03 RX ORDER — ACETAMINOPHEN 325 MG/1
650 TABLET ORAL
Status: DISCONTINUED | OUTPATIENT
Start: 2020-04-03 | End: 2020-04-05 | Stop reason: HOSPADM

## 2020-04-03 RX ORDER — IBUPROFEN 800 MG/1
800 TABLET ORAL
Qty: 50 TAB | Refills: 1 | Status: SHIPPED | OUTPATIENT
Start: 2020-04-03

## 2020-04-03 RX ORDER — OXYCODONE AND ACETAMINOPHEN 5; 325 MG/1; MG/1
1 TABLET ORAL
Qty: 30 TAB | Refills: 0 | Status: SHIPPED | OUTPATIENT
Start: 2020-04-03 | End: 2020-04-06

## 2020-04-03 RX ADMIN — SODIUM CHLORIDE, SODIUM LACTATE, POTASSIUM CHLORIDE, AND CALCIUM CHLORIDE 125 ML/HR: 600; 310; 30; 20 INJECTION, SOLUTION INTRAVENOUS at 00:39

## 2020-04-03 RX ADMIN — KETOROLAC TROMETHAMINE 30 MG: 30 INJECTION, SOLUTION INTRAMUSCULAR at 05:26

## 2020-04-03 RX ADMIN — IBUPROFEN 800 MG: 400 TABLET, FILM COATED ORAL at 23:00

## 2020-04-03 RX ADMIN — ACETAMINOPHEN 650 MG: 325 TABLET, FILM COATED ORAL at 19:08

## 2020-04-03 RX ADMIN — Medication 10 ML: at 05:26

## 2020-04-03 RX ADMIN — IBUPROFEN 800 MG: 400 TABLET, FILM COATED ORAL at 14:11

## 2020-04-03 NOTE — DISCHARGE SUMMARY
Obstetrical Discharge Summary     Name: Blossom Cuevas MRN: [id number]  SSN: [id number]    YOB: 1990  Age: 35 y.o. Sex: female      Admit Date: 2020    Discharge Date: 20    Admitting Physician: José Miguel Sanabria MD     Attending Physician:  Jonelle Unger MD     Admission Diagnoses: Twins live born in hospital [Z38.30]    Discharge Diagnoses:   Information for the patient's :  Rusty Ferguson [[id number]]   Delivery of a 2.95 kg male infant via , Low Transverse on 2020 at 8:42 AM  by José Miguel Sanabria. Apgars were 9  and 9 . Information for the patient's :  Yakov Clarke [[id number]]   Delivery of a 3.125 kg male infant via , Low Transverse on 2020 at 8:43 AM  by José Miguel Sanabria. Apgars were 9  and 9 . Additional Diagnoses:   Hospital Problems  Never Reviewed          Codes Class Noted POA    Twins live born in hospital ICD-10-CM: Z38.30  ICD-9-CM: V33.00  2020 Unknown             Lab Results   Component Value Date/Time    Rubella, External immune 09/10/2019    GrBStrep, External negative 2020       Immunization(s): There is no immunization history for the selected administration types on file for this patient. Hospital Course: Normal hospital course following the delivery. Planned  section for twins (Donzetta Munda). Uncomplicated delivery and PP course. Patient Instructions:   Current Discharge Medication List      START taking these medications    Details   oxyCODONE-acetaminophen (PERCOCET) 5-325 mg per tablet Take 1 Tab by mouth every four (4) hours as needed for Pain for up to 3 days. Max Daily Amount: 6 Tabs. Qty: 30 Tab, Refills: 0    Associated Diagnoses: S/P  section         CONTINUE these medications which have CHANGED    Details   ibuprofen (MOTRIN) 800 mg tablet Take 1 Tab by mouth every eight (8) hours as needed for Pain.   Qty: 50 Tab, Refills: 1    Associated Diagnoses: S/P  section         CONTINUE these medications which have NOT CHANGED    Details   ferrous sulfate (SLOW FE PO) Take 1 Tab by mouth daily. Indications: anemia      FOLIC ACID PO Take 1 Tab by mouth daily. PNV66-Iron Fumarate-FA-DSS-DHA 26-1.2- mg cap Take  by mouth daily. Indications: pregnancy             Please make followup appointment for 4-6 weeks  Pelvic rest for 6 weeks  Pain medication as prescribed. Use of contraception as discussed.     Condition: stable  Disposition: home    Follow-up Appointments   Procedures    FOLLOW UP VISIT Appointment in: Donal Moyer     Standing Status:   Standing     Number of Occurrences:   1     Order Specific Question:   Appointment in     Answer:   6 Weeks        Signed By:  Jessica Rosales MD     April 3, 2020

## 2020-04-03 NOTE — PERIOP NOTES
Anesthesia Post Operative Day 1    Patient is s/p neuraxial Duramorph for  Section. The patient and spouse are asleep, therefore the history was provided by her nurse. There was no discomfort, no itching and no nausea. There were no motor/sensation abnormalities and no complaints of a headache. Anesthesia site was reported as normal. All symptoms were treated with protocol medications with good results. Patient was up and ambulating without complaints. Plan: Continue Duramorph protocol as needed. Reconsult PRN.

## 2020-04-03 NOTE — ROUTINE PROCESS
0730:Bedside and Verbal shift change report given to JOEL Jaime (oncoming nurse) by Amaris Burgos (offgoing nurse). Report included the following information SBAR.

## 2020-04-03 NOTE — LACTATION NOTE
This note was copied from a baby's chart. Both infants noted to have 24 hour weight loss of over 6%. Discussed recommendation to start pumping following nursing sessions to further stimulate lactogenesis II. Mom is very tearful at the time of my visit due to pain when nursing infants. I provided mom with a nipple shield and used sweet ease to get infants latched. We used the breast friend nursing pillow to latch both infants at the same time. Baby B was sleepier and it took multiple attempts to get him latched. He then nursed for 12 minutes consistently. Baby A latched easily on the left breast.  I suggested to mom that she take a nap following this feeding session.  She will not pump following this nursing as she is very emotional.

## 2020-04-03 NOTE — PROGRESS NOTES
Post-Operative  Day 1    Elena G Betsy     Assessment: Post-Op day 1, stable    Plan:   1. Routine post-operative care   2. The risks and benefits of the circumcision  procedure and anesthesia including: bleeding, infection, variability of cosmetic results were discussed at length with the mother. She is aware that future repeat procedures may be necessary. She gives informed consent to proceed as noted and her questions are answered. 3. Hgb 12.3    Information for the patient's :  Ashkan Dunbar [[id number]]   , Low Transverse  Information for the patient's :  Spencer Lu [[id number]]   , Low Transverse   Patient doing well without significant complaint. Nausea and vomiting resolved, tolerating regular diet; has flatus, voiding without problems. Vitals:  Visit Vitals  /66 (BP 1 Location: Left arm, BP Patient Position: At rest;Sitting)   Pulse 75   Temp 98.2 °F (36.8 °C)   Resp 16   Ht 5' 4\" (1.626 m)   Wt 101.6 kg (224 lb)   SpO2 99%   Breastfeeding Unknown   BMI 38.45 kg/m²     Temp (24hrs), Av.2 °F (36.8 °C), Min:97.9 °F (36.6 °C), Max:98.4 °F (36.9 °C)      Last 24hr Input/Output:    Intake/Output Summary (Last 24 hours) at 4/3/2020 1424  Last data filed at 2020 2130  Gross per 24 hour   Intake    Output 800 ml   Net -800 ml          Exam:        Patient without distress. Abdomen soft, expected tenderness, fundus firm Wound dressing intact               Lower extremities are negative for asymmetric swelling, cords or tenderness.     Labs:   Lab Results   Component Value Date/Time    WBC 9.2 2020 05:10 AM    WBC 6.9 2020 07:34 AM    WBC 12.4 (H) 2018 11:32 AM    HGB 12.3 2020 05:10 AM    HGB 12.8 2020 07:34 AM    HGB 14.1 2018 11:32 AM    HCT 37.9 2020 05:10 AM    HCT 38.9 2020 07:34 AM    HCT 42.0 2018 11:32 AM    PLATELET 115 (L) 5049 05:10 AM    PLATELET 641 (L) 04/02/2020 07:34 AM    PLATELET 865 84/97/7598 11:32 AM       Recent Results (from the past 24 hour(s))   CBC WITH AUTOMATED DIFF    Collection Time: 04/03/20  5:10 AM   Result Value Ref Range    WBC 9.2 3.6 - 11.0 K/uL    RBC 4.19 3.80 - 5.20 M/uL    HGB 12.3 11.5 - 16.0 g/dL    HCT 37.9 35.0 - 47.0 %    MCV 90.5 80.0 - 99.0 FL    MCH 29.4 26.0 - 34.0 PG    MCHC 32.5 30.0 - 36.5 g/dL    RDW 18.5 (H) 11.5 - 14.5 %    PLATELET 033 (L) 391 - 400 K/uL    MPV 12.0 8.9 - 12.9 FL    NRBC 0.0 0  WBC    ABSOLUTE NRBC 0.00 0.00 - 0.01 K/uL    NEUTROPHILS 78 (H) 32 - 75 %    LYMPHOCYTES 14 12 - 49 %    MONOCYTES 7 5 - 13 %    EOSINOPHILS 0 0 - 7 %    BASOPHILS 0 0 - 1 %    IMMATURE GRANULOCYTES 1 (H) 0.0 - 0.5 %    ABS. NEUTROPHILS 7.1 1.8 - 8.0 K/UL    ABS. LYMPHOCYTES 1.3 0.8 - 3.5 K/UL    ABS. MONOCYTES 0.7 0.0 - 1.0 K/UL    ABS. EOSINOPHILS 0.0 0.0 - 0.4 K/UL    ABS. BASOPHILS 0.0 0.0 - 0.1 K/UL    ABS. IMM.  GRANS. 0.1 (H) 0.00 - 0.04 K/UL    DF AUTOMATED

## 2020-04-04 PROCEDURE — 74011250637 HC RX REV CODE- 250/637: Performed by: OBSTETRICS & GYNECOLOGY

## 2020-04-04 PROCEDURE — 65410000002 HC RM PRIVATE OB

## 2020-04-04 RX ADMIN — IBUPROFEN 800 MG: 400 TABLET, FILM COATED ORAL at 22:39

## 2020-04-04 RX ADMIN — IBUPROFEN 800 MG: 400 TABLET, FILM COATED ORAL at 07:06

## 2020-04-04 RX ADMIN — OXYCODONE HYDROCHLORIDE AND ACETAMINOPHEN 1 TABLET: 5; 325 TABLET ORAL at 10:26

## 2020-04-04 RX ADMIN — OXYCODONE HYDROCHLORIDE AND ACETAMINOPHEN 1 TABLET: 5; 325 TABLET ORAL at 20:18

## 2020-04-04 RX ADMIN — ACETAMINOPHEN 650 MG: 325 TABLET, FILM COATED ORAL at 04:58

## 2020-04-04 RX ADMIN — ACETAMINOPHEN 650 MG: 325 TABLET, FILM COATED ORAL at 00:20

## 2020-04-04 RX ADMIN — IBUPROFEN 800 MG: 400 TABLET, FILM COATED ORAL at 14:55

## 2020-04-04 RX ADMIN — OXYCODONE HYDROCHLORIDE AND ACETAMINOPHEN 1 TABLET: 5; 325 TABLET ORAL at 14:55

## 2020-04-04 RX ADMIN — MUPIROCIN: 20 OINTMENT TOPICAL at 15:00

## 2020-04-04 NOTE — LACTATION NOTE
This note was copied from a baby's chart. Mother has sustained significant damage to both nipples, bruising, excoriation, and blistering. Colby Brown's Cream ordered. Mother has stopped nursing overnight, using pump and giving formula via syringe. Today mother attempted latching both babies as single infants. She is unable to tolerate latching on right side, and will forgo nursing on that side until nipple heals. She is aware to continue pumping on that breast.  Babies nurse well on left breast.  She is alternating nursing one baby per session.

## 2020-04-04 NOTE — PROGRESS NOTES
Bedside shift change report given to STEVEN Lyons, RN (oncoming nurse) by Raissa Toussaint. Simeon Aquino RN (offgoing nurse). Report included the following information SBAR.

## 2020-04-04 NOTE — PROGRESS NOTES
Post-Operative  Day 2    Missy Mobley Betsy     Assessment: Post-Op day 2, doing well    Plan:   1. Routine post-operative care    Information for the patient's :  Jennifer Miguel [[id number]]   , Low Transverse  Information for the patient's :  Meng Corcoran [[id number]]   , Low Transverse   Patient doing well without significant complaint. Nausea and vomiting resolved, tolerating PO, passing flatus, voiding and ambulating without difficulty. Vitals:  Visit Vitals  /75 (BP 1 Location: Left arm, BP Patient Position: At rest)   Pulse 76   Temp 97.9 °F (36.6 °C)   Resp 16   Ht 5' 4\" (1.626 m)   Wt 101.6 kg (224 lb)   SpO2 98%   Breastfeeding Unknown   BMI 38.45 kg/m²     Temp (24hrs), Av °F (36.7 °C), Min:97.6 °F (36.4 °C), Max:98.4 °F (36.9 °C)        Exam:        Patient without distress. Abdomen, soft, expected tenderness, fundus firm                Wound incision clean, dry and intact               Lower extremities are symmetric without tenderness, cords or erythema. Labs:   Lab Results   Component Value Date/Time    WBC 9.2 2020 05:10 AM    WBC 6.9 2020 07:34 AM    WBC 12.4 (H) 2018 11:32 AM    HGB 12.3 2020 05:10 AM    HGB 12.8 2020 07:34 AM    HGB 14.1 2018 11:32 AM    HCT 37.9 2020 05:10 AM    HCT 38.9 2020 07:34 AM    HCT 42.0 2018 11:32 AM    PLATELET 332 (L)  05:10 AM    PLATELET 980 (L)  07:34 AM    PLATELET 012  11:32 AM       No results found for this or any previous visit (from the past 24 hour(s)).

## 2020-04-04 NOTE — ROUTINE PROCESS
0800:  Bedside and Verbal shift change report given to Junior Castillo RN  (oncoming nurse) by Maren العراقي RN  (offgoing nurse). Report included the following information SBAR.

## 2020-04-04 NOTE — PROGRESS NOTES
Bedside and Verbal shift change report given to JOEL Coles RN (oncoming nurse) by STEVEN Anna RN (offgoing nurse). Report included the following information SBAR, Kardex, Procedure Summary, Intake/Output, MAR, Accordion and Recent Results.

## 2020-04-05 VITALS
SYSTOLIC BLOOD PRESSURE: 122 MMHG | WEIGHT: 224 LBS | HEART RATE: 78 BPM | HEIGHT: 64 IN | DIASTOLIC BLOOD PRESSURE: 74 MMHG | RESPIRATION RATE: 16 BRPM | OXYGEN SATURATION: 98 % | TEMPERATURE: 98.2 F | BODY MASS INDEX: 38.24 KG/M2

## 2020-04-05 PROCEDURE — 74011250637 HC RX REV CODE- 250/637: Performed by: OBSTETRICS & GYNECOLOGY

## 2020-04-05 RX ADMIN — OXYCODONE HYDROCHLORIDE AND ACETAMINOPHEN 1 TABLET: 5; 325 TABLET ORAL at 00:37

## 2020-04-05 RX ADMIN — DOCUSATE SODIUM 100 MG: 100 CAPSULE, LIQUID FILLED ORAL at 12:54

## 2020-04-05 RX ADMIN — OXYCODONE HYDROCHLORIDE AND ACETAMINOPHEN 1 TABLET: 5; 325 TABLET ORAL at 13:38

## 2020-04-05 RX ADMIN — OXYCODONE HYDROCHLORIDE AND ACETAMINOPHEN 1 TABLET: 5; 325 TABLET ORAL at 04:40

## 2020-04-05 RX ADMIN — IBUPROFEN 800 MG: 400 TABLET, FILM COATED ORAL at 06:53

## 2020-04-05 RX ADMIN — OXYCODONE HYDROCHLORIDE AND ACETAMINOPHEN 1 TABLET: 5; 325 TABLET ORAL at 09:12

## 2020-04-05 NOTE — ROUTINE PROCESS
Bedside and Verbal shift change report given to STEVEN Peck RN (oncoming nurse) by [facility] ([facility]going nurse). Report included the following information SBAR.

## 2020-04-05 NOTE — PROGRESS NOTES
Post-Operative  Day 3    Marilu Meyer       Assessment: Post-Op day 3, doing well    Plan:   1. Discharge home today  2. Follow up in office in 6 weeks with Rusty Sky MD  3. Post partum activity/wound care advised, diet as tolerated  4. Discharge Medications: pain medication per discharge summary and medications prior to admission      Information for the patient's :  Stella Root [[id number]]   , Low Transverse  Information for the patient's :  Irma Mendez [[id number]]   , Low Transverse   Patient doing well without significant complaint. Tolerating diet, passing flatus, voiding and ambulating without difficulty    Vitals:  Visit Vitals  /81 (BP 1 Location: Left arm, BP Patient Position: At rest)   Pulse 71   Temp 97.8 °F (36.6 °C)   Resp 16   Ht 5' 4\" (1.626 m)   Wt 101.6 kg (224 lb)   SpO2 98%   Breastfeeding Unknown   BMI 38.45 kg/m²     Temp (24hrs), Av.8 °F (36.6 °C), Min:97.8 °F (36.6 °C), Max:97.9 °F (36.6 °C)        Exam:        Patient without distress. Abdomen, soft, expected tenderness, fundus firm                Wound incision clean, dry and intact               Lower extremities are symmetric without tenderness, cords or erythema. Labs:   Lab Results   Component Value Date/Time    WBC 9.2 2020 05:10 AM    WBC 6.9 2020 07:34 AM    WBC 12.4 (H) 2018 11:32 AM    HGB 12.3 2020 05:10 AM    HGB 12.8 2020 07:34 AM    HGB 14.1 2018 11:32 AM    HCT 37.9 2020 05:10 AM    HCT 38.9 2020 07:34 AM    HCT 42.0 2018 11:32 AM    PLATELET 850 (L)  05:10 AM    PLATELET 198 (L)  07:34 AM    PLATELET 611  11:32 AM       No results found for this or any previous visit (from the past 24 hour(s)).

## 2020-04-05 NOTE — ROUTINE PROCESS
Verbal shift change report given to CLAUDIA Sands RN (oncoming nurse) by Cortes Salomon RN (offgoing nurse). Report included the following information SBAR, Intake/Output, MAR and Recent Results.

## 2020-04-05 NOTE — ROUTINE PROCESS
0800: Bedside and Verbal shift change report given to Aayush Waite RN  (oncoming nurse) by CLAUDIA Heart RN (offgoing nurse). Report included the following information SBAR.

## 2020-04-05 NOTE — LACTATION NOTE
This note was copied from a baby's chart. Mother's nipples are healing well with Ashleylawrence Brown's Cream.  Infant's are taking turns nursing from left side. Mother will attempt latching on right side today. Mother continues to pump that side. Milk is in. Mother inquires about starting bottle feeding. Parents encouraged to continue syringe feeding until latching is well established for both babies, or if parents find that syringe feeding is difficult for infant's. Parents aware that babies could reject the breast once bottle feeding. Parents could use nipple shield if that happens. All options discussed. Parents will choose method that best works for them. Mother states that she has no further questions for Lactation Consultant before discharge.

## 2020-04-05 NOTE — DISCHARGE INSTRUCTIONS
POSTPARTUM DISCHARGE INSTRUCTIONS       Name:  Zoraida Logan  YOB: 1990  Admission Diagnosis:  Twins live born in hospital [Z38.30]     Discharge Diagnosis:    Problem List as of 2020 Never Reviewed          Codes Class Noted - Resolved    Twins live born in hospital ICD-10-CM: Z38.30  ICD-9-CM: V33.00  2020 - Present        Pregnancy ICD-10-CM: Z34.90  ICD-9-CM: V22.2  2018 - Present            Attending Physician:  Sally Roberts MD    Delivery Type:   Section: What to Expect at Home    Your Recovery:  A  section, or , is surgery to deliver your baby through a cut, called an incision that the doctor makes in your lower belly and uterus. You may have some pain in your lower belly and need pain medicine for 1 to 2 weeks. You can expect some vaginal bleeding for several weeks. You will probably need about 6 weeks to fully recover. It is important to take it easy while the incision is healing. Avoid heavy lifting, strenuous activities, or exercises that strain the belly muscles while you are recovering. Ask a family member or friend for help with housework, cooking, and shopping. This care sheet gives you a general idea about how long it will take for you to recover. But each person recovers at a different pace. Follow the steps below to get better as quickly as possible. How can you care for yourself at home? Activity  · Rest when you feel tired. Getting enough sleep will help you recover. · Try to walk each day. Start by walking a little more than you did the day before. Bit by bit, increase the amount you walk. Walking boosts blood flow and helps prevent pneumonia, constipation, and blood clots. · Avoid strenuous activities, such as bicycle riding, jogging, weightlifting, and aerobic exercise, for 6 weeks or until your doctor says it is okay. · Until your doctor says it is okay, do not lift anything heavier than your baby.   · Do not do sit-ups or other exercise that strain the belly muscles for 6 weeks or until your doctor says it is okay. · Hold a pillow over your incision when you cough or take deep breaths. This will support your belly and decrease your pain. · You may shower as usual. Pat the incision dry when you are done. · You will have some vaginal bleeding. Wear sanitary pads. Do not douche or use tampons until your doctor says it is okay. · Ask your doctor when you can drive again. · You will probably need to take at least 6 weeks off work. It depends on the type of work you do and how you feel. · Wait until you are healed (about 4 to 6 weeks) before you have sexual intercourse. Your doctor will tell you when it is okay to have sex. · Talk to your doctor about birth control. You can get pregnant even before your period returns. Also, you can get pregnant while you are breast-feeding. Incision care  Your skin is your body's first line of defense against germs, but an incision site leaves an easy way for germs to enter your body. To prevent infection:  · Clean your hands frequently and before and after changing any touching any dressings. · If you have strips of tape on the incision, leave the tape on for a week or until it falls off. · Look at your incision closely every day for any changes. Contact your doctor if you experience any signs of infection, such as fever or increased redness at the surgical site. · Wash the area daily with warm, soapy water, and pat it dry. Don't use hydrogen peroxide or alcohol, which can slow healing. You may cover the area with a gauze bandage if it weeps or rubs against clothing. Change the bandage every day. · Keep the area clean and dry. Diet  · You can eat your normal diet. If your stomach is upset, try bland, low-fat foods like plain rice, broiled chicken, toast, and yogurt. · Drink plenty of fluids (unless your doctor tells you not to).   · You may notice that your bowel movements are not regular right after your surgery. This is common. Try to avoid constipation and straining with bowel movements. You may want to take a fiber supplement every day. If you have not had a bowel movement after a couple of days, ask your doctor about taking a mild laxative. · If you are breast-feeding, do not drink any alcohol. Medicines  · Take pain medicines exactly as directed. · If the doctor gave you a prescription medicine for pain, take it as prescribed. · If you are not taking a prescription pain medicine, ask your doctor if you can take an over-the-counter medicine such as acetaminophen (Tylenol), ibuprofen (Advil, Motrin), or naproxen (Aleve), for cramps. Read and follow all instructions on the label. Do not take aspirin, because it can cause more bleeding. Do not take acetaminophen (Tylenol) and other acetaminophen containing medications (i.e. Percocet) at the same time. · If you think your pain medicine is making you sick to your stomach:  · Take your medicine after meals (unless your doctor has told you not to). · Ask your doctor for a different pain medicine. · If your doctor prescribed antibiotics, take them as directed. Do not stop taking them just because you feel better. You need to take the full course of antibiotics. Mental Health  · Many women get the \"baby blues\" during the first few days after childbirth. You may lose sleep, feel irritable, and cry easily. You may feel happy one minute and sad the next. Hormone changes are one cause of these emotional changes. Also, the demands of a new baby, along with visits from relatives or other family needs, add to a mother's stress. The \"baby blues\" often peak around the fourth day. Then they ease up in less than 2 weeks. · If your moodiness or anxiety lasts for more than 2 weeks, or if you feel like life is not worth living, you may have postpartum depression. This is different for each mother.  Some mothers with serious depression may worry intensely about their infant's well-being. Others may feel distant from their child. Some mothers might even feel that they might harm their baby. A mother may have signs of paranoia, wondering if someone is watching her. · With all the changes in your life, you may not know if you are depressed. Pregnancy sometimes causes changes in how you feel that are similar to the symptoms of depression. · Symptoms of depression include:  · Feeling sad or hopeless and losing interest in daily activities. These are the most common symptoms of depression. · Sleeping too much or not enough. · Feeling tired. You may feel as if you have no energy. · Eating too much or too little. · [employer] ([employer]) offers a Warm line; Chat with the Expert phone sessions; Information and Articles about Pregnancy and Postpartum Mood Disorders; Comprehensive List of Free Support Groups; Knowledgeable local coordinators who will offer support, information, and resources; Guide to Resources on [place]; Calendar of events in the  mood disorders community; Latest News and Research; and [facility] & [facility] [address] for [employer]. Remember - You are not alone; You are not to blame; With help, you will be well. 7-238-695-PPD(0351). WWW. POSTPARTUM. NET   · Writing or talking about death, such as writing suicide notes or talking about guns, knives, or pills. Keep the numbers for these national suicide hotlines: 4-333-983-TALK (3-274.812.1044) and 4-786-DDUUWVL (6-279.641.3208). If you or someone you know talks about suicide or feeling hopeless, get help right away. Other instructions  · If you breast-feed your baby, you may be more comfortable while you are healing if you place the baby so that he or she is not resting on your belly. Try tucking your baby under your arm, with his or her body along the side you will be feeding on. Support your baby's upper body with your arm.  With that hand you can control your baby's head to bring his or her mouth to your breast. This is sometimes called the football hold. Follow-up care is a key part of your treatment and safety. Be sure to make and go to all appointments, and call your doctor if you are having problems. It's also a good idea to know your test results and keep a list of the medicines you take. When should you call for help? Call 911 anytime you think you may need emergency care. For example, call if:  · You are thinking of hurting yourself, your baby, or anyone else. · You passed out (lost consciousness). · You have symptoms of a blood clot in your lung (called a pulmonary embolism). These may include:  · Sudden chest pain. · Trouble breathing. · Coughing up blood. Call your doctor now or seek immediate medical care if:    · You have severe vaginal bleeding. · You are soaking through a pad each hour for 2 or more hours. · Your vaginal bleeding seems to be getting heavier or is still bright red 4 days after delivery. · You are dizzy or lightheaded, or you feel like you may faint. · You are vomiting or cannot keep fluids down. · You have a fever. · You have new or more belly pain. · You have loose stitches, or your incision comes open. · You have symptoms of infection, such as:  · Increased pain, swelling, warmth, or redness. · Red streaks leading from the incision. · Pus draining from the incision. · A fever  · You pass tissue (not just blood). · Your vaginal discharge smells bad. · Your belly feels tender or full and hard. · Your breasts are continuously painful or red. · You feel sad, anxious, or hopeless for more than a few days. · You have sudden, severe pain in your belly. · You have symptoms of a blood clot in your leg (called a deep vein thrombosis), such as:  · Pain in your calf, back of the knee, thigh, or groin. · Redness and swelling in your leg or groin.   · You have symptoms of preeclampsia, such as:  · Sudden swelling of your face, hands, or feet. · New vision problems (such as dimness or blurring). · A severe headache. · Your blood pressure is higher than it should be or rises suddenly. · You have new nausea or vomiting. Watch closely for changes in your health, and be sure to contact your doctor if you have any problems. Additional Information:  Not applicable    These are general instructions for a healthy lifestyle:    No smoking/ No tobacco products/ Avoid exposure to second hand smoke    Surgeon General's Warning:  Quitting smoking now greatly reduces serious risk to your health. Obesity, smoking, and sedentary lifestyle greatly increases your risk for illness    A healthy diet, regular physical exercise & weight monitoring are important for maintaining a healthy lifestyle    Recognize signs and symptoms of STROKE:    F-face looks uneven    A-arms unable to move or move unevenly    S-speech slurred or non-existent    T-time-call 911 as soon as signs and symptoms begin - DO NOT go       back to bed or wait to see if you get better - TIME IS BRAIN. I have had the opportunity to make my options or choices for discharge. I have received and understand these instructions.

## 2022-03-19 PROBLEM — Z34.90 PREGNANCY: Status: ACTIVE | Noted: 2018-04-28

## 2023-05-11 RX ORDER — IBUPROFEN 800 MG/1
TABLET ORAL EVERY 8 HOURS PRN
COMMUNITY
Start: 2020-04-03